# Patient Record
Sex: MALE | Race: BLACK OR AFRICAN AMERICAN | NOT HISPANIC OR LATINO
[De-identification: names, ages, dates, MRNs, and addresses within clinical notes are randomized per-mention and may not be internally consistent; named-entity substitution may affect disease eponyms.]

---

## 2017-06-16 ENCOUNTER — APPOINTMENT (OUTPATIENT)
Dept: NEUROSURGERY | Facility: CLINIC | Age: 67
End: 2017-06-16

## 2017-06-16 ENCOUNTER — INPATIENT (INPATIENT)
Facility: HOSPITAL | Age: 67
LOS: 2 days | Discharge: HOME | DRG: 305 | End: 2017-06-19
Attending: INTERNAL MEDICINE | Admitting: INTERNAL MEDICINE
Payer: MEDICARE

## 2017-06-16 VITALS
HEART RATE: 72 BPM | OXYGEN SATURATION: 94 % | DIASTOLIC BLOOD PRESSURE: 81 MMHG | RESPIRATION RATE: 16 BRPM | SYSTOLIC BLOOD PRESSURE: 180 MMHG

## 2017-06-16 VITALS
BODY MASS INDEX: 37.6 KG/M2 | HEART RATE: 35 BPM | RESPIRATION RATE: 16 BRPM | DIASTOLIC BLOOD PRESSURE: 91 MMHG | OXYGEN SATURATION: 93 % | HEIGHT: 74 IN | SYSTOLIC BLOOD PRESSURE: 190 MMHG | WEIGHT: 293 LBS

## 2017-06-16 VITALS
OXYGEN SATURATION: 95 % | HEART RATE: 42 BPM | DIASTOLIC BLOOD PRESSURE: 90 MMHG | RESPIRATION RATE: 20 BRPM | TEMPERATURE: 98 F | SYSTOLIC BLOOD PRESSURE: 190 MMHG

## 2017-06-16 DIAGNOSIS — I16.0 HYPERTENSIVE URGENCY: ICD-10-CM

## 2017-06-16 DIAGNOSIS — R00.1 BRADYCARDIA, UNSPECIFIED: ICD-10-CM

## 2017-06-16 DIAGNOSIS — Z95.5 PRESENCE OF CORONARY ANGIOPLASTY IMPLANT AND GRAFT: Chronic | ICD-10-CM

## 2017-06-16 DIAGNOSIS — Z98.89 OTHER SPECIFIED POSTPROCEDURAL STATES: Chronic | ICD-10-CM

## 2017-06-16 LAB
ALBUMIN SERPL ELPH-MCNC: 4 G/DL — SIGNIFICANT CHANGE UP (ref 3.3–5)
ALP SERPL-CCNC: 71 U/L — SIGNIFICANT CHANGE UP (ref 40–120)
ALT FLD-CCNC: 23 U/L RC — SIGNIFICANT CHANGE UP (ref 10–45)
ANION GAP SERPL CALC-SCNC: 11 MMOL/L — SIGNIFICANT CHANGE UP (ref 5–17)
AST SERPL-CCNC: 25 U/L — SIGNIFICANT CHANGE UP (ref 10–40)
BASOPHILS # BLD AUTO: 0.1 K/UL — SIGNIFICANT CHANGE UP (ref 0–0.2)
BASOPHILS NFR BLD AUTO: 1.4 % — SIGNIFICANT CHANGE UP (ref 0–2)
BILIRUB SERPL-MCNC: 0.5 MG/DL — SIGNIFICANT CHANGE UP (ref 0.2–1.2)
BUN SERPL-MCNC: 14 MG/DL — SIGNIFICANT CHANGE UP (ref 7–23)
CALCIUM SERPL-MCNC: 9.7 MG/DL — SIGNIFICANT CHANGE UP (ref 8.4–10.5)
CHLORIDE SERPL-SCNC: 99 MMOL/L — SIGNIFICANT CHANGE UP (ref 96–108)
CK MB BLD-MCNC: 2.7 % — SIGNIFICANT CHANGE UP (ref 0–3.5)
CK MB CFR SERPL CALC: 14.4 NG/ML — HIGH (ref 0–6.7)
CK SERPL-CCNC: 530 U/L — HIGH (ref 30–200)
CO2 SERPL-SCNC: 32 MMOL/L — HIGH (ref 22–31)
CREAT SERPL-MCNC: 1.32 MG/DL — HIGH (ref 0.5–1.3)
EOSINOPHIL # BLD AUTO: 0.2 K/UL — SIGNIFICANT CHANGE UP (ref 0–0.5)
EOSINOPHIL NFR BLD AUTO: 2.7 % — SIGNIFICANT CHANGE UP (ref 0–6)
GLUCOSE SERPL-MCNC: 104 MG/DL — HIGH (ref 70–99)
HCT VFR BLD CALC: 44 % — SIGNIFICANT CHANGE UP (ref 39–50)
HGB BLD-MCNC: 13.9 G/DL — SIGNIFICANT CHANGE UP (ref 13–17)
LYMPHOCYTES # BLD AUTO: 1.4 K/UL — SIGNIFICANT CHANGE UP (ref 1–3.3)
LYMPHOCYTES # BLD AUTO: 19.4 % — SIGNIFICANT CHANGE UP (ref 13–44)
MAGNESIUM SERPL-MCNC: 1.8 MG/DL — SIGNIFICANT CHANGE UP (ref 1.6–2.6)
MCHC RBC-ENTMCNC: 31.6 PG — SIGNIFICANT CHANGE UP (ref 27–34)
MCHC RBC-ENTMCNC: 31.7 GM/DL — LOW (ref 32–36)
MCV RBC AUTO: 99.8 FL — SIGNIFICANT CHANGE UP (ref 80–100)
MONOCYTES # BLD AUTO: 0.4 K/UL — SIGNIFICANT CHANGE UP (ref 0–0.9)
MONOCYTES NFR BLD AUTO: 5.7 % — SIGNIFICANT CHANGE UP (ref 2–14)
NEUTROPHILS # BLD AUTO: 5 K/UL — SIGNIFICANT CHANGE UP (ref 1.8–7.4)
NEUTROPHILS NFR BLD AUTO: 70.9 % — SIGNIFICANT CHANGE UP (ref 43–77)
PLATELET # BLD AUTO: 241 K/UL — SIGNIFICANT CHANGE UP (ref 150–400)
POTASSIUM SERPL-MCNC: 4.2 MMOL/L — SIGNIFICANT CHANGE UP (ref 3.5–5.3)
POTASSIUM SERPL-SCNC: 4.2 MMOL/L — SIGNIFICANT CHANGE UP (ref 3.5–5.3)
PROT SERPL-MCNC: 7.8 G/DL — SIGNIFICANT CHANGE UP (ref 6–8.3)
RBC # BLD: 4.41 M/UL — SIGNIFICANT CHANGE UP (ref 4.2–5.8)
RBC # FLD: 12.8 % — SIGNIFICANT CHANGE UP (ref 10.3–14.5)
SODIUM SERPL-SCNC: 142 MMOL/L — SIGNIFICANT CHANGE UP (ref 135–145)
TROPONIN T SERPL-MCNC: 0.11 NG/ML — HIGH (ref 0–0.06)
TROPONIN T SERPL-MCNC: 0.12 NG/ML — HIGH (ref 0–0.06)
WBC # BLD: 7 K/UL — SIGNIFICANT CHANGE UP (ref 3.8–10.5)
WBC # FLD AUTO: 7 K/UL — SIGNIFICANT CHANGE UP (ref 3.8–10.5)

## 2017-06-16 PROCEDURE — 93010 ELECTROCARDIOGRAM REPORT: CPT

## 2017-06-16 PROCEDURE — 99285 EMERGENCY DEPT VISIT HI MDM: CPT | Mod: 25

## 2017-06-16 PROCEDURE — 71010: CPT | Mod: 26

## 2017-06-16 RX ORDER — DEXTROSE 50 % IN WATER 50 %
25 SYRINGE (ML) INTRAVENOUS ONCE
Qty: 0 | Refills: 0 | Status: DISCONTINUED | OUTPATIENT
Start: 2017-06-16 | End: 2017-06-19

## 2017-06-16 RX ORDER — HYDRALAZINE HCL 50 MG
25 TABLET ORAL THREE TIMES A DAY
Qty: 0 | Refills: 0 | Status: DISCONTINUED | OUTPATIENT
Start: 2017-06-16 | End: 2017-06-17

## 2017-06-16 RX ORDER — SODIUM CHLORIDE 9 MG/ML
1000 INJECTION, SOLUTION INTRAVENOUS
Qty: 0 | Refills: 0 | Status: DISCONTINUED | OUTPATIENT
Start: 2017-06-16 | End: 2017-06-19

## 2017-06-16 RX ORDER — ENOXAPARIN SODIUM 100 MG/ML
40 INJECTION SUBCUTANEOUS DAILY
Qty: 0 | Refills: 0 | Status: DISCONTINUED | OUTPATIENT
Start: 2017-06-16 | End: 2017-06-19

## 2017-06-16 RX ORDER — DEXTROSE 50 % IN WATER 50 %
1 SYRINGE (ML) INTRAVENOUS ONCE
Qty: 0 | Refills: 0 | Status: DISCONTINUED | OUTPATIENT
Start: 2017-06-16 | End: 2017-06-19

## 2017-06-16 RX ORDER — SIMVASTATIN 20 MG/1
20 TABLET, FILM COATED ORAL AT BEDTIME
Qty: 0 | Refills: 0 | Status: DISCONTINUED | OUTPATIENT
Start: 2017-06-16 | End: 2017-06-18

## 2017-06-16 RX ORDER — FUROSEMIDE 40 MG
40 TABLET ORAL DAILY
Qty: 0 | Refills: 0 | Status: DISCONTINUED | OUTPATIENT
Start: 2017-06-16 | End: 2017-06-17

## 2017-06-16 RX ORDER — DEXTROSE 50 % IN WATER 50 %
12.5 SYRINGE (ML) INTRAVENOUS ONCE
Qty: 0 | Refills: 0 | Status: DISCONTINUED | OUTPATIENT
Start: 2017-06-16 | End: 2017-06-19

## 2017-06-16 RX ORDER — POTASSIUM CHLORIDE 20 MEQ
20 PACKET (EA) ORAL DAILY
Qty: 0 | Refills: 0 | Status: DISCONTINUED | OUTPATIENT
Start: 2017-06-16 | End: 2017-06-19

## 2017-06-16 RX ORDER — GLUCAGON INJECTION, SOLUTION 0.5 MG/.1ML
1 INJECTION, SOLUTION SUBCUTANEOUS ONCE
Qty: 0 | Refills: 0 | Status: DISCONTINUED | OUTPATIENT
Start: 2017-06-16 | End: 2017-06-19

## 2017-06-16 RX ORDER — HYDRALAZINE HYDROCHLORIDE 25 MG/1
25 TABLET ORAL
Qty: 90 | Refills: 0 | Status: DISCONTINUED | COMMUNITY
Start: 2017-02-16

## 2017-06-16 RX ORDER — ERGOCALCIFEROL 1.25 MG/1
1.25 MG CAPSULE, LIQUID FILLED ORAL
Qty: 4 | Refills: 0 | Status: DISCONTINUED | COMMUNITY
Start: 2017-06-08

## 2017-06-16 RX ORDER — INSULIN LISPRO 100/ML
VIAL (ML) SUBCUTANEOUS
Qty: 0 | Refills: 0 | Status: DISCONTINUED | OUTPATIENT
Start: 2017-06-16 | End: 2017-06-19

## 2017-06-16 RX ORDER — SODIUM SULFATE, POTASSIUM SULFATE, MAGNESIUM SULFATE 17.5; 3.13; 1.6 G/ML; G/ML; G/ML
17.5-3.13-1.6 SOLUTION, CONCENTRATE ORAL
Qty: 354 | Refills: 0 | Status: DISCONTINUED | COMMUNITY
Start: 2017-04-05

## 2017-06-16 RX ORDER — AMLODIPINE BESYLATE 2.5 MG/1
10 TABLET ORAL DAILY
Qty: 0 | Refills: 0 | Status: DISCONTINUED | OUTPATIENT
Start: 2017-06-16 | End: 2017-06-19

## 2017-06-16 RX ORDER — ASPIRIN/CALCIUM CARB/MAGNESIUM 324 MG
81 TABLET ORAL DAILY
Qty: 0 | Refills: 0 | Status: DISCONTINUED | OUTPATIENT
Start: 2017-06-16 | End: 2017-06-19

## 2017-06-16 RX ORDER — LISINOPRIL 2.5 MG/1
10 TABLET ORAL DAILY
Qty: 0 | Refills: 0 | Status: DISCONTINUED | OUTPATIENT
Start: 2017-06-16 | End: 2017-06-17

## 2017-06-16 RX ADMIN — LISINOPRIL 10 MILLIGRAM(S): 2.5 TABLET ORAL at 23:29

## 2017-06-16 RX ADMIN — Medication 40 MILLIGRAM(S): at 21:25

## 2017-06-16 RX ADMIN — ENOXAPARIN SODIUM 40 MILLIGRAM(S): 100 INJECTION SUBCUTANEOUS at 21:25

## 2017-06-16 RX ADMIN — SIMVASTATIN 20 MILLIGRAM(S): 20 TABLET, FILM COATED ORAL at 23:28

## 2017-06-16 RX ADMIN — AMLODIPINE BESYLATE 10 MILLIGRAM(S): 2.5 TABLET ORAL at 21:25

## 2017-06-16 RX ADMIN — Medication 25 MILLIGRAM(S): at 21:27

## 2017-06-16 RX ADMIN — Medication 81 MILLIGRAM(S): at 21:25

## 2017-06-16 NOTE — ED ADULT NURSE NOTE - OBJECTIVE STATEMENT
67yo male walked into ED a+ox3, sent in from PMD office for asymptomatics bradycardia. Pt was at pmd for followup post back surgery, and was found to be bradycardic to 30 (32), pt sent to ED for eval. Pt states at the time he did not feel dizzy, weak, in pain, sob, cp at time of bradycardia. Pt denies dizziness/lightheadedness, sob, cp, n/v/d, pain, abd pain, fevers, chills, cough, change in medications in ED. Pt NSR on CM, in 60s-70s HR. Lung sounds clear. Abd soft round nontender. Pt has hx of 2 stents(2010), htn, hld, dm2.

## 2017-06-16 NOTE — ED PROVIDER NOTE - MEDICAL DECISION MAKING DETAILS
Period of asymptomatic bradycardia now resolved. Will check electrolytes and outpatient Holter monitoring,

## 2017-06-16 NOTE — H&P ADULT - ASSESSMENT
pt  sent  to  er  by spine  md who  noted  hr  of  35, pt  with  no  symptoms, tele,  echo, seen by  house  card,  not  on AV  SATNAM BLOCKING  AGENTS,  CAD WITH  2 STENTS,  LAST  STRESS TEST  2015, REFUSING THIS TEST pt  sent  to  er  by spine  md who  noted  hr  of  35, pt  with  no  symptoms, tele,  echo, seen by  house  card,  not  on AV  SATNAM BLOCKING  AGENTS,  CAD WITH  2 STENTS,  LAST  STRESS TEST  2015, REFUSING THIS TEST, pt  with high  sbp  and  positive troponin,  needs  to  have  ischemia  w/p

## 2017-06-16 NOTE — H&P ADULT - NSHPLABSRESULTS_GEN_ALL_CORE
LABS:                        13.9   7.0   )-----------( 241      ( 16 Jun 2017 13:24 )             44.0     06-16    142  |  99  |  14  ----------------------------<  104<H>  4.2   |  32<H>  |  1.32<H>    Ca    9.7      16 Jun 2017 13:24  Mg     1.8     06-16    TPro  7.8  /  Alb  4.0  /  TBili  0.5  /  DBili  x   /  AST  25  /  ALT  23  /  AlkPhos  71  06-16            RADIOLOGY & ADDITIONAL TESTS:

## 2017-06-16 NOTE — CONSULT NOTE ADULT - PROBLEM SELECTOR RECOMMENDATION 9
Bp improved. On enalapril and norvasc.   Diuresis will also help. Would try to change to PO in next 24 hours.

## 2017-06-16 NOTE — CONSULT NOTE ADULT - ASSESSMENT
66 M with history of CAD and prior PCI noted to have asymptomatic bradycardia, HTN with significantly elevated BP and elevated troponin

## 2017-06-16 NOTE — CONSULT NOTE ADULT - SUBJECTIVE AND OBJECTIVE BOX
House Cardiology Initial Consult  Consult Spectra [phone]    HISTORY OF PRESENT ILLNESS:  66M CAD s/p stent, DM2, LBP. Was at orthopedic clinic    Allergies    No Known Allergies    Intolerances    	    MEDICATIONS:                  PAST MEDICAL & SURGICAL HISTORY:  Osteoarthritis  Lumbar disc disease with radiculopathy  Cervical disc disease with myelopathy  DVT (deep venous thrombosis): left soleal DVT- August 2014- On coumadin  CAD (coronary artery disease)  HTN (Hypertension), Benign  Dyslipidemia  Diabetes: not on meds at this time due to recent 50 lb weight loss  S/P knee surgery: right &amp; left  S/P lumbar laminectomy  Stented coronary artery: 2 Drug elluding stents      FAMILY HISTORY:  No pertinent family history in first degree relatives      SOCIAL HISTORY:    [ ] Non-smoker  [ ] Smoker  [ ] Alcohol    Review of Systems:  Constitutional: [ ] Fever [ ] Chills [ ] Fatigue [ ] Weight change   HEENT: [ ] Blurred vision [ ] Eye Pain [ ] Headache [ ] Runny nose [ ] Sore Throat   Respiratory: [ ] Cough [ ] Wheezing [ ] Shortness of breath  Cardiovascular: [ ] Chest Pain [ ] Palpitations [ ] CAMPOS [ ] PND [ ] Orthopnea  Gastrointestinal: [ ] Abdominal Pain [ ] Diarrhea [ ] Constipation [ ] Hemorrhoids [ ] Nausea [ ] Vomiting  Genitourinary: [ ] Nocturia [ ] Dysuria [ ] Incontinence  Extremities: [ ] Swelling [ ] Joint Pain  Neurologic: [ ] Focal deficit [ ] Paresthesias [ ] Syncope  Lymphatic: [ ] Swelling [ ] Lymphadenopathy   Skin: [ ] Rash [ ] Ecchymoses [ ] Wounds [ ] Lesions  Psychiatry: [ ] Depression [ ] Suicidal/Homicidal Ideation [ ] Anxiety [ ] Sleep Disturbances  [ ] 10 point review of systems is otherwise negative except as mentioned above            [ ]Unable to obtain  PHYSICAL EXAM:  T(C): 36.6, Max: 36.6 (06-16 @ 12:38)  HR: 71 (42 - 71)  BP: 156/100 (156/100 - 190/90)  RR: 20 (20 - 20)  SpO2: 94% (94% - 95%)  Wt(kg): --  I&O's Summary      Appearance: Normal	  HEENT:   Normal oral mucosa, PERRL, EOMI	  Cardiovascular: Normal S1 S2, No JVD, No murmurs, No edema  Respiratory: Lungs clear to auscultation	  Psychiatry: A & O x 3, Mood & affect appropriate  Gastrointestinal:  soft nt nd normoactive bs	  Skin: No rashes, No ecchymoses, No cyanosis	  Neurologic: grossly non-focal  Extremities: Normal range of motion, No clubbing, cyanosis or  Vascular: Peripheral pulses palpable 2+ bilaterally      LABS:	 	    CBC Full  -  ( 16 Jun 2017 13:24 )  WBC Count : 7.0 K/uL  Hemoglobin : 13.9 g/dL  Hematocrit : 44.0 %  Platelet Count - Automated : 241 K/uL  Mean Cell Volume : 99.8 fl  Mean Cell Hemoglobin : 31.6 pg  Mean Cell Hemoglobin Concentration : 31.7 gm/dL  Auto Neutrophil # : 5.0 K/uL  Auto Lymphocyte # : 1.4 K/uL  Auto Monocyte # : 0.4 K/uL  Auto Eosinophil # : 0.2 K/uL  Auto Basophil # : 0.1 K/uL  Auto Neutrophil % : 70.9 %  Auto Lymphocyte % : 19.4 %  Auto Monocyte % : 5.7 %  Auto Eosinophil % : 2.7 %  Auto Basophil % : 1.4 %    06-16    142  |  99  |  14  ----------------------------<  104<H>  4.2   |  32<H>  |  1.32<H>    Ca    9.7      16 Jun 2017 13:24  Mg     1.8     06-16    TPro  7.8  /  Alb  4.0  /  TBili  0.5  /  DBili  x   /  AST  25  /  ALT  23  /  AlkPhos  71  06-16      proBNP: Serum Pro-Brain Natriuretic Peptide: 42 pg/mL (06-16 @ 13:24)    Lipid Profile:   HgA1c:   TSH:       CARDIAC MARKERS:  Troponin T, Serum: 0.12 ng/mL (06-16 @ 13:24)            TELEMETRY: 	    ECG:  	  RADIOLOGY:  OTHER: 	    PREVIOUS DIAGNOSTIC TESTING:    [ ] Echocardiogram:  [ ] Catheterization:  [ ] Stress Test:  	  	  ASSESSMENT/PLAN: 	    66yMale House Cardiology Initial Consult  Consult Spectra [phone]    HISTORY OF PRESENT ILLNESS:  66M CAD s/p stent, DM2, HLD, LBP, DVT 2014. Was at orthopedic clinic when reportedly the patient's HR went to 32 (no strips available) and was subsequently brought to the ED for further evaluation.    Allergies    No Known Allergies    Intolerances    	    MEDICATIONS:                  PAST MEDICAL & SURGICAL HISTORY:  Osteoarthritis  Lumbar disc disease with radiculopathy  Cervical disc disease with myelopathy  DVT (deep venous thrombosis): left soleal DVT- August 2014- On coumadin  CAD (coronary artery disease)  HTN (Hypertension), Benign  Dyslipidemia  Diabetes: not on meds at this time due to recent 50 lb weight loss  S/P knee surgery: right &amp; left  S/P lumbar laminectomy  Stented coronary artery: 2 Drug elluding stents      FAMILY HISTORY:  No pertinent family history in first degree relatives      SOCIAL HISTORY:    [ ] Non-smoker  [ ] Smoker  [ ] Alcohol    Review of Systems:  Constitutional: [ ] Fever [ ] Chills [ ] Fatigue [ ] Weight change   HEENT: [ ] Blurred vision [ ] Eye Pain [ ] Headache [ ] Runny nose [ ] Sore Throat   Respiratory: [ ] Cough [ ] Wheezing [ ] Shortness of breath  Cardiovascular: [ ] Chest Pain [ ] Palpitations [ ] CAMPOS [ ] PND [ ] Orthopnea  Gastrointestinal: [ ] Abdominal Pain [ ] Diarrhea [ ] Constipation [ ] Hemorrhoids [ ] Nausea [ ] Vomiting  Genitourinary: [ ] Nocturia [ ] Dysuria [ ] Incontinence  Extremities: [ ] Swelling [ ] Joint Pain  Neurologic: [ ] Focal deficit [ ] Paresthesias [ ] Syncope  Lymphatic: [ ] Swelling [ ] Lymphadenopathy   Skin: [ ] Rash [ ] Ecchymoses [ ] Wounds [ ] Lesions  Psychiatry: [ ] Depression [ ] Suicidal/Homicidal Ideation [ ] Anxiety [ ] Sleep Disturbances  [ ] 10 point review of systems is otherwise negative except as mentioned above            [ ]Unable to obtain  PHYSICAL EXAM:  T(C): 36.6, Max: 36.6 (06-16 @ 12:38)  HR: 71 (42 - 71)  BP: 156/100 (156/100 - 190/90)  RR: 20 (20 - 20)  SpO2: 94% (94% - 95%)  Wt(kg): --  I&O's Summary      Appearance: Normal	  HEENT:   Normal oral mucosa, PERRL, EOMI	  Cardiovascular: Normal S1 S2, No JVD, No murmurs, No edema  Respiratory: Lungs clear to auscultation	  Psychiatry: A & O x 3, Mood & affect appropriate  Gastrointestinal:  soft nt nd normoactive bs	  Skin: No rashes, No ecchymoses, No cyanosis	  Neurologic: grossly non-focal  Extremities: Normal range of motion, No clubbing, cyanosis or  Vascular: Peripheral pulses palpable 2+ bilaterally      LABS:	 	    CBC Full  -  ( 16 Jun 2017 13:24 )  WBC Count : 7.0 K/uL  Hemoglobin : 13.9 g/dL  Hematocrit : 44.0 %  Platelet Count - Automated : 241 K/uL  Mean Cell Volume : 99.8 fl  Mean Cell Hemoglobin : 31.6 pg  Mean Cell Hemoglobin Concentration : 31.7 gm/dL  Auto Neutrophil # : 5.0 K/uL  Auto Lymphocyte # : 1.4 K/uL  Auto Monocyte # : 0.4 K/uL  Auto Eosinophil # : 0.2 K/uL  Auto Basophil # : 0.1 K/uL  Auto Neutrophil % : 70.9 %  Auto Lymphocyte % : 19.4 %  Auto Monocyte % : 5.7 %  Auto Eosinophil % : 2.7 %  Auto Basophil % : 1.4 %    06-16    142  |  99  |  14  ----------------------------<  104<H>  4.2   |  32<H>  |  1.32<H>    Ca    9.7      16 Jun 2017 13:24  Mg     1.8     06-16    TPro  7.8  /  Alb  4.0  /  TBili  0.5  /  DBili  x   /  AST  25  /  ALT  23  /  AlkPhos  71  06-16      proBNP: Serum Pro-Brain Natriuretic Peptide: 42 pg/mL (06-16 @ 13:24)    Lipid Profile:   HgA1c:   TSH:       CARDIAC MARKERS:  Troponin T, Serum: 0.12 ng/mL (06-16 @ 13:24)  Troponin T, Serum: 0.11 ng/mL (@16:18)    CKMB 14.4      ECG:  	 	    PREVIOUS DIAGNOSTIC TESTING:    [ ] Echocardiogram: 4/12/2015  Conclusions:  1. Endocardium not well visualized; grossly normal left  ventricular function.  2. Unable to accurately evaluate right ventricular size or  systolic function.  3. Minimal mitral regurgitation.  [ ] Catheterization:  [ ] Stress Test:  	  	  ASSESSMENT/PLAN: 	  66M CAD s/p stent, DM2, HLD, LBP, DVT 2014. Was at orthopedic clinic when reportedly the patient's HR went to 32 (no strips available) House Cardiology Initial Consult  Consult Spectra [phone]    HISTORY OF PRESENT ILLNESS:  66M CAD s/p stent, DM2, HLD, LBP, DVT 2014. Was at orthopedic clinic when reportedly the patient's HR went to 32 (no strips available, however reportedly confirmed by auscultation by medical assistant) and was subsequently brought to the ED for further evaluation. BP was high at that time. Pt has no complaints.    Found to have an elevated troponin with a non-ischemic EKG in the ED. HTN to SBP 190s. HR 60s. SpO2 dropped to mid-high 80s and improved with NC.    Allergies    No Known Allergies    MEDICATIONS:      PAST MEDICAL & SURGICAL HISTORY:  Osteoarthritis  Lumbar disc disease with radiculopathy  Cervical disc disease with myelopathy  DVT (deep venous thrombosis): left soleal DVT- August 2014- On coumadin  CAD (coronary artery disease)  HTN (Hypertension), Benign  Dyslipidemia  Diabetes: not on meds at this time due to recent 50 lb weight loss  S/P knee surgery: right &amp; left  S/P lumbar laminectomy  Stented coronary artery: 2 Drug elluding stents    FAMILY HISTORY:  No pertinent family history in first degree relatives    SOCIAL HISTORY:    [ ] Non-smoker  [ ] Smoker  [ ] Alcohol    Review of Systems:  Constitutional: [ -] Fever [- ] Chills [- ] Fatigue [ x] Weight change   HEENT: [ -] Blurred vision [ ] Eye Pain [- ] Headache [- ] Runny nose [ ] Sore Throat   Respiratory: [ -] Cough [- ] Wheezing -[ ] Shortness of breath  Cardiovascular: [- ] Chest Pain [- ] Palpitations [- ] CAMPOS [ ] PND [- ] Orthopnea  Gastrointestinal: [ ] Abdominal Pain [ ] Diarrhea [ ] Constipation [ ] Hemorrhoids [- ] Nausea [- ] Vomiting  Genitourinary: [ ] Nocturia [- ] Dysuria [ ] Incontinence  Extremities: [ ] Swelling [x ] Joint Pain - chronic  Neurologic: [ ] Focal deficit [ ] Paresthesias [- ] Syncope  Lymphatic: [ ] Swelling [ ] Lymphadenopathy   Skin: [- ] Rash [ ] Ecchymoses [ ] Wounds [ ] Lesions  Psychiatry: [ -] Depression [ ] Suicidal/Homicidal Ideation [ ] Anxiety [ ] Sleep Disturbances  [x ] 10 point review of systems is otherwise negative except as mentioned above            [ ]Unable to obtain    PHYSICAL EXAM:  T(C): 36.6, Max: 36.6 (06-16 @ 12:38)  HR: 71 (42 - 71)  BP: 156/100 (156/100 - 190/90)  RR: 20 (20 - 20)  SpO2: 94% (94% - 95%)    Appearance: No acute distress	  HEENT:   MMM	  Cardiovascular: s1s2 rrr no m/g/r, 1+ LE edema  Respiratory: Lungs clear to auscultationbilaterally  Psychiatry: A & O x 3, Mood & affect appropriate  Gastrointestinal:  large, soft nt nd normoactive bs	  Skin: No cyanosis	  Neurologic: grossly non-focal  Extremities: No cyanosis  Vascular: Peripheral pulses palpable 2+ bilaterally      LABS:	 	    CBC Full  -  ( 16 Jun 2017 13:24 )  WBC Count : 7.0 K/uL  Hemoglobin : 13.9 g/dL  Hematocrit : 44.0 %  Platelet Count - Automated : 241 K/uL  Mean Cell Volume : 99.8 fl  Mean Cell Hemoglobin : 31.6 pg  Mean Cell Hemoglobin Concentration : 31.7 gm/dL  Auto Neutrophil # : 5.0 K/uL  Auto Lymphocyte # : 1.4 K/uL  Auto Monocyte # : 0.4 K/uL  Auto Eosinophil # : 0.2 K/uL  Auto Basophil # : 0.1 K/uL  Auto Neutrophil % : 70.9 %  Auto Lymphocyte % : 19.4 %  Auto Monocyte % : 5.7 %  Auto Eosinophil % : 2.7 %  Auto Basophil % : 1.4 %    06-16    142  |  99  |  14  ----------------------------<  104<H>  4.2   |  32<H>  |  1.32<H>    Ca    9.7      16 Jun 2017 13:24  Mg     1.8     06-16    TPro  7.8  /  Alb  4.0  /  TBili  0.5  /  DBili  x   /  AST  25  /  ALT  23  /  AlkPhos  71  06-16    proBNP: Serum Pro-Brain Natriuretic Peptide: 42 pg/mL (06-16 @ 13:24)    CARDIAC MARKERS:  Troponin T, Serum: 0.12 ng/mL (06-16 @ 13:24)  Troponin T, Serum: 0.11 ng/mL (@16:18)    CKMB 14.4      ECG:  	 	SR 73 small r wave III, aVF, PAC. no acute ischemic ST changes.    PREVIOUS DIAGNOSTIC TESTING:    [x] Echocardiogram: 4/12/2015  Conclusions:  1. Endocardium not well visualized; grossly normal left  ventricular function.  2. Unable to accurately evaluate right ventricular size or  systolic function.  3. Minimal mitral regurgitation.  [ ] Catheterization:  [ ] Stress Test:  	  	  ASSESSMENT/PLAN: 	  66M CAD s/p stent, DM2, HLD, LBP, DVT 2014. Was at orthopedic clinic when reportedly the patient's HR went to 32 (no strips available), asymptomatic but found to have elevated BP and mildly elevated troponin. Suspect that these troponins may reflect hypertensive heart disease or demand ischemia in setting of uncontrolled hypertension. Unlikely ACS given lack of symptoms. CPK Mass assay wnl. BNP low but may be false negative due to pt body habitus.  -check TTE  -trend cardiac enzymes  -afterload reduction as needed  -hold AVN blockers. unclear why pt bradycardic. monitor on tele.  -given mild hypoxia ?VERONICA/OHS vs component of early pulmonary edema. afterload reduction as above, with caveat that if pt does not improve would give SL NTG and IV furosemide. House Cardiology Initial Consult  Consult Spectra [phone]    HISTORY OF PRESENT ILLNESS:  66M CAD s/p stent, DM2, HLD, LBP, DVT 2014. Was at orthopedic clinic when reportedly the patient's HR went to 32 (no strips available, however reportedly confirmed by auscultation by medical assistant) and was subsequently brought to the ED for further evaluation. BP was high at that time. Pt has no complaints.    Found to have an elevated troponin with a non-ischemic EKG in the ED. HTN to SBP 190s. HR 60s. SpO2 dropped to mid-high 80s and improved with NC.    Allergies    No Known Allergies    MEDICATIONS:  Reviewed in EMR      PAST MEDICAL & SURGICAL HISTORY:  Osteoarthritis  Lumbar disc disease with radiculopathy  Cervical disc disease with myelopathy  DVT (deep venous thrombosis): left soleal DVT- August 2014- On coumadin  CAD (coronary artery disease)  HTN (Hypertension), Benign  Dyslipidemia  Diabetes: not on meds at this time due to recent 50 lb weight loss  S/P knee surgery: right &amp; left  S/P lumbar laminectomy  Stented coronary artery: 2 Drug elluding stents    FAMILY HISTORY:  No pertinent family history in first degree relatives    SOCIAL HISTORY:    [ X] Non-smoker  [ ] Smoker  [ ] Alcohol    Review of Systems:  Constitutional: [ -] Fever [- ] Chills [- ] Fatigue [ x] Weight change   HEENT: [ -] Blurred vision [ ] Eye Pain [- ] Headache [- ] Runny nose [ ] Sore Throat   Respiratory: [ -] Cough [- ] Wheezing -[ ] Shortness of breath  Cardiovascular: [- ] Chest Pain [- ] Palpitations [- ] CAMPOS [ ] PND [- ] Orthopnea  Gastrointestinal: [ ] Abdominal Pain [ ] Diarrhea [ ] Constipation [ ] Hemorrhoids [- ] Nausea [- ] Vomiting  Genitourinary: [ ] Nocturia [- ] Dysuria [ ] Incontinence  Extremities: [ ] Swelling [x ] Joint Pain - chronic  Neurologic: [ ] Focal deficit [ ] Paresthesias [- ] Syncope  Lymphatic: [ ] Swelling [ ] Lymphadenopathy   Skin: [- ] Rash [ ] Ecchymoses [ ] Wounds [ ] Lesions  Psychiatry: [ -] Depression [ ] Suicidal/Homicidal Ideation [ ] Anxiety [ ] Sleep Disturbances  [x ] 10 point review of systems is otherwise negative except as mentioned above            [ ]Unable to obtain    PHYSICAL EXAM:  T(C): 36.6, Max: 36.6 (06-16 @ 12:38)  HR: 71 (42 - 71)  BP: 156/100 (156/100 - 190/90)  RR: 20 (20 - 20)  SpO2: 94% (94% - 95%)    Appearance: No acute distress	  HEENT:   MMM	  Cardiovascular: s1s2 rrr no m/g/r, 1+ LE edema  Respiratory: Lungs clear to auscultationbilaterally  Psychiatry: A & O x 3, Mood & affect appropriate  Gastrointestinal:  large, soft nt nd normoactive bs	  Skin: No cyanosis	  Neurologic: grossly non-focal  Extremities: No cyanosis  Vascular: Peripheral pulses palpable 2+ bilaterally      LABS:	 	    CBC Full  -  ( 16 Jun 2017 13:24 )  WBC Count : 7.0 K/uL  Hemoglobin : 13.9 g/dL  Hematocrit : 44.0 %  Platelet Count - Automated : 241 K/uL  Mean Cell Volume : 99.8 fl  Mean Cell Hemoglobin : 31.6 pg  Mean Cell Hemoglobin Concentration : 31.7 gm/dL  Auto Neutrophil # : 5.0 K/uL  Auto Lymphocyte # : 1.4 K/uL  Auto Monocyte # : 0.4 K/uL  Auto Eosinophil # : 0.2 K/uL  Auto Basophil # : 0.1 K/uL  Auto Neutrophil % : 70.9 %  Auto Lymphocyte % : 19.4 %  Auto Monocyte % : 5.7 %  Auto Eosinophil % : 2.7 %  Auto Basophil % : 1.4 %    06-16    142  |  99  |  14  ----------------------------<  104<H>  4.2   |  32<H>  |  1.32<H>    Ca    9.7      16 Jun 2017 13:24  Mg     1.8     06-16    TPro  7.8  /  Alb  4.0  /  TBili  0.5  /  DBili  x   /  AST  25  /  ALT  23  /  AlkPhos  71  06-16    proBNP: Serum Pro-Brain Natriuretic Peptide: 42 pg/mL (06-16 @ 13:24)    CARDIAC MARKERS:  Troponin T, Serum: 0.12 ng/mL (06-16 @ 13:24)  Troponin T, Serum: 0.11 ng/mL (@16:18)    CKMB 14.4      ECG:  	 	SR 73 small r wave III, aVF, PAC. no acute ischemic ST changes.    PREVIOUS DIAGNOSTIC TESTING:    [x] Echocardiogram: 4/12/2015  Conclusions:  1. Endocardium not well visualized; grossly normal left  ventricular function.  2. Unable to accurately evaluate right ventricular size or  systolic function.  3. Minimal mitral regurgitation.  [ ] Catheterization:  [ ] Stress Test:  	  	  ASSESSMENT/PLAN: 	  66M CAD s/p stent, DM2, HLD, LBP, DVT 2014. Was at orthopedic clinic when reportedly the patient's HR went to 32 (no strips available), asymptomatic but found to have elevated BP and mildly elevated troponin. Suspect that these troponins may reflect hypertensive heart disease or demand ischemia in setting of uncontrolled hypertension. Unlikely ACS given lack of symptoms. CPK Mass assay wnl. BNP low but may be false negative due to pt body habitus.  -check TTE  -trend cardiac enzymes  -afterload reduction as needed  -hold AVN blockers. unclear why pt bradycardic. monitor on tele.  -given mild hypoxia ?VERONICA/OHS vs component of early pulmonary edema. afterload reduction as above, with caveat that if pt does not improve would give SL NTG and IV furosemide.

## 2017-06-16 NOTE — ED PROVIDER NOTE - CARE PLAN
Principal Discharge DX:	Hypertensive urgency  Secondary Diagnosis:	Bradycardia  Secondary Diagnosis:	NSTEMI (non-ST elevated myocardial infarction)

## 2017-06-16 NOTE — ED PROVIDER NOTE - OBJECTIVE STATEMENT
66 year old male patient with pmhx of CAD s/p 2 stents placed 2010, type 2 DM, lumber disc disease presents to the ED with bradycardia down to 32 at the orthopedist's office. Denies any symptoms at the time. Patient's heartrate is normal in the ED.   Cardiologist: Dr. Flash Hills [phone]

## 2017-06-16 NOTE — H&P ADULT - NSHPREVIEWOFSYSTEMS_GEN_ALL_CORE

## 2017-06-16 NOTE — CONSULT NOTE ADULT - PROBLEM SELECTOR RECOMMENDATION 3
Unclear cause of troponin elevation. Patient denies cardiac symptoms. Is this late presentation of silent MI or related to elevated BP. Stress is reasonable. I explained to the patient that with elevated CE he cannot walk on the treadmill as recent MI cannot be excluded. Pharm stress.

## 2017-06-16 NOTE — H&P ADULT - HISTORY OF PRESENT ILLNESS
66 year old male patient with pmhx of CAD s/p 2 stents placed 2010, type 2 DM, lumber disc herniation,  s/p  laminectoy in  the past,  presents to the ED with bradycardia down to 32 at the orthopedist's office. Denies any symptoms at the time. Patient's heartrate is normal in the ED.,  denies  cp/  sob/  abd pain,  in  er, pt  has  positive  cpk  and  troponin,  seen by  house card,  pt  asymptomatic  Cardiologist: Dr. Flash Hills [phone] 66 year old male patient with pmhx of CAD s/p 2 stents placed 2010, type 2 DM, lumber disc herniation,  s/p  laminectoy in  the past,  presents to the ED with bradycardia down to 32 at the orthopedist's office. Denies any symptoms at the time. Patient's heartrate is normal in the ED.,  denies  cp/  sob/  abd pain,  in  er, pt  has  positive  cpk  and  troponin  NSTEMI, ,  seen by  house card,  pt  asymptomatic  Cardiologist: Dr. Flash Hills [phone]

## 2017-06-16 NOTE — ED ADULT NURSE NOTE - PSH
S/P knee surgery  right & left  S/P lumbar laminectomy    Stented coronary artery  2 Drug elluding stents

## 2017-06-16 NOTE — ED PROVIDER NOTE - PROGRESS NOTE DETAILS
Alejandro Hills Called Dr. Munoz. Dr. Munoz has not seen patient in 3 years. Recommends house cardiology consult. Spoke to house cards fellow who reviewed case. Will see the patient. In the meantime, will get 2nd troponin to trend. Updated patient on need for further evaluation. Sat noted to drop a little bit. Will get x-ray and bnp.

## 2017-06-16 NOTE — ED ADULT NURSE NOTE - PMH
CAD (coronary artery disease)    Cervical disc disease with myelopathy    Diabetes  not on meds at this time due to recent 50 lb weight loss  DVT (deep venous thrombosis)  left soleal DVT- August 2014- On coumadin  Dyslipidemia    HTN (Hypertension), Benign    Lumbar disc disease with radiculopathy    Osteoarthritis

## 2017-06-16 NOTE — H&P ADULT - NSHPPHYSICALEXAM_GEN_ALL_CORE
PHYSICAL EXAMINATION:  Vital Signs Last 24 Hrs  T(C): 36.7, Max: 36.7 (06-16 @ 17:55)  T(F): 98.1, Max: 98.1 (06-16 @ 17:55)  HR: 68 (42 - 71)  BP: 174/97 (156/100 - 190/90)  BP(mean): --  RR: 18 (18 - 20)  SpO2: 100% (94% - 100%)  CAPILLARY BLOOD GLUCOSE        GENERAL: NAD, well-groomed, well-developed  HEAD:  atraumatic, normocephalic  EYES: sclera anicteric  ENMT: mucous membranes moist  NECK: supple, No JVD  CHEST/LUNG: clear to auscultation bilaterally; no rales, rhonchi, or wheezing b/l  HEART: normal S1, S2  ABDOMEN: BS+, soft, ND, NT   EXTREMITIES:  pulses palpable; no clubbing, cyanosis,   2 plus  edema b/l LEs  NEURO: awake, alert, interactive; moves all extremities  SKIN: no rashes or lesions

## 2017-06-17 DIAGNOSIS — I25.10 ATHEROSCLEROTIC HEART DISEASE OF NATIVE CORONARY ARTERY WITHOUT ANGINA PECTORIS: ICD-10-CM

## 2017-06-17 DIAGNOSIS — I16.0 HYPERTENSIVE URGENCY: ICD-10-CM

## 2017-06-17 DIAGNOSIS — R74.8 ABNORMAL LEVELS OF OTHER SERUM ENZYMES: ICD-10-CM

## 2017-06-17 DIAGNOSIS — E78.5 HYPERLIPIDEMIA, UNSPECIFIED: ICD-10-CM

## 2017-06-17 LAB
ANION GAP SERPL CALC-SCNC: 16 MMOL/L — SIGNIFICANT CHANGE UP (ref 5–17)
BUN SERPL-MCNC: 16 MG/DL — SIGNIFICANT CHANGE UP (ref 7–23)
CALCIUM SERPL-MCNC: 9.8 MG/DL — SIGNIFICANT CHANGE UP (ref 8.4–10.5)
CHLORIDE SERPL-SCNC: 94 MMOL/L — LOW (ref 96–108)
CK MB BLD-MCNC: 2.4 % — SIGNIFICANT CHANGE UP (ref 0–3.5)
CK MB CFR SERPL CALC: 11.6 NG/ML — HIGH (ref 0–6.7)
CK SERPL-CCNC: 476 U/L — HIGH (ref 30–200)
CO2 SERPL-SCNC: 29 MMOL/L — SIGNIFICANT CHANGE UP (ref 22–31)
CREAT SERPL-MCNC: 1.29 MG/DL — SIGNIFICANT CHANGE UP (ref 0.5–1.3)
GLUCOSE SERPL-MCNC: 105 MG/DL — HIGH (ref 70–99)
HBA1C BLD-MCNC: 7.4 % — HIGH (ref 4–5.6)
POTASSIUM SERPL-MCNC: 4.2 MMOL/L — SIGNIFICANT CHANGE UP (ref 3.5–5.3)
POTASSIUM SERPL-SCNC: 4.2 MMOL/L — SIGNIFICANT CHANGE UP (ref 3.5–5.3)
SODIUM SERPL-SCNC: 139 MMOL/L — SIGNIFICANT CHANGE UP (ref 135–145)
TROPONIN T SERPL-MCNC: 0.09 NG/ML — HIGH (ref 0–0.06)
TSH SERPL-MCNC: 1.73 UIU/ML — SIGNIFICANT CHANGE UP (ref 0.27–4.2)

## 2017-06-17 PROCEDURE — 99223 1ST HOSP IP/OBS HIGH 75: CPT

## 2017-06-17 RX ORDER — FUROSEMIDE 40 MG
40 TABLET ORAL DAILY
Qty: 0 | Refills: 0 | Status: DISCONTINUED | OUTPATIENT
Start: 2017-06-17 | End: 2017-06-18

## 2017-06-17 RX ORDER — ISOPROPYL ALCOHOL, BENZOCAINE .7; .06 ML/ML; ML/ML
0 SWAB TOPICAL
Qty: 1 | Refills: 0
Start: 2017-06-17

## 2017-06-17 RX ORDER — FUROSEMIDE 40 MG
20 TABLET ORAL ONCE
Qty: 0 | Refills: 0 | Status: COMPLETED | OUTPATIENT
Start: 2017-06-17 | End: 2017-06-17

## 2017-06-17 RX ADMIN — Medication 20 MILLIGRAM(S): at 10:25

## 2017-06-17 RX ADMIN — LISINOPRIL 10 MILLIGRAM(S): 2.5 TABLET ORAL at 05:31

## 2017-06-17 RX ADMIN — Medication 25 MILLIGRAM(S): at 05:31

## 2017-06-17 RX ADMIN — Medication 20 MILLIEQUIVALENT(S): at 13:19

## 2017-06-17 RX ADMIN — AMLODIPINE BESYLATE 10 MILLIGRAM(S): 2.5 TABLET ORAL at 05:31

## 2017-06-17 RX ADMIN — Medication 40 MILLIGRAM(S): at 05:31

## 2017-06-17 RX ADMIN — Medication 81 MILLIGRAM(S): at 13:18

## 2017-06-17 RX ADMIN — SIMVASTATIN 20 MILLIGRAM(S): 20 TABLET, FILM COATED ORAL at 21:56

## 2017-06-17 RX ADMIN — Medication 20 MILLIGRAM(S): at 17:30

## 2017-06-17 RX ADMIN — ENOXAPARIN SODIUM 40 MILLIGRAM(S): 100 INJECTION SUBCUTANEOUS at 13:18

## 2017-06-18 LAB
ANION GAP SERPL CALC-SCNC: 19 MMOL/L — HIGH (ref 5–17)
BUN SERPL-MCNC: 24 MG/DL — HIGH (ref 7–23)
CALCIUM SERPL-MCNC: 9.5 MG/DL — SIGNIFICANT CHANGE UP (ref 8.4–10.5)
CHLORIDE SERPL-SCNC: 93 MMOL/L — LOW (ref 96–108)
CO2 SERPL-SCNC: 29 MMOL/L — SIGNIFICANT CHANGE UP (ref 22–31)
CREAT SERPL-MCNC: 1.42 MG/DL — HIGH (ref 0.5–1.3)
GLUCOSE SERPL-MCNC: 110 MG/DL — HIGH (ref 70–99)
INR BLD: 1.03 RATIO — SIGNIFICANT CHANGE UP (ref 0.88–1.16)
POTASSIUM SERPL-MCNC: 4.1 MMOL/L — SIGNIFICANT CHANGE UP (ref 3.5–5.3)
POTASSIUM SERPL-SCNC: 4.1 MMOL/L — SIGNIFICANT CHANGE UP (ref 3.5–5.3)
PROTHROM AB SERPL-ACNC: 11.6 SEC — SIGNIFICANT CHANGE UP (ref 10–13.1)
SODIUM SERPL-SCNC: 141 MMOL/L — SIGNIFICANT CHANGE UP (ref 135–145)

## 2017-06-18 PROCEDURE — 99232 SBSQ HOSP IP/OBS MODERATE 35: CPT

## 2017-06-18 RX ORDER — ATORVASTATIN CALCIUM 80 MG/1
40 TABLET, FILM COATED ORAL AT BEDTIME
Qty: 0 | Refills: 0 | Status: DISCONTINUED | OUTPATIENT
Start: 2017-06-18 | End: 2017-06-19

## 2017-06-18 RX ORDER — CLOPIDOGREL BISULFATE 75 MG/1
75 TABLET, FILM COATED ORAL DAILY
Qty: 0 | Refills: 0 | Status: DISCONTINUED | OUTPATIENT
Start: 2017-06-18 | End: 2017-06-19

## 2017-06-18 RX ORDER — HYDRALAZINE HCL 50 MG
50 TABLET ORAL THREE TIMES A DAY
Qty: 0 | Refills: 0 | Status: DISCONTINUED | OUTPATIENT
Start: 2017-06-18 | End: 2017-06-19

## 2017-06-18 RX ADMIN — Medication 40 MILLIGRAM(S): at 05:17

## 2017-06-18 RX ADMIN — AMLODIPINE BESYLATE 10 MILLIGRAM(S): 2.5 TABLET ORAL at 05:17

## 2017-06-18 RX ADMIN — Medication 20 MILLIEQUIVALENT(S): at 14:06

## 2017-06-18 RX ADMIN — Medication 81 MILLIGRAM(S): at 14:07

## 2017-06-18 RX ADMIN — ATORVASTATIN CALCIUM 40 MILLIGRAM(S): 80 TABLET, FILM COATED ORAL at 21:44

## 2017-06-18 RX ADMIN — Medication 50 MILLIGRAM(S): at 21:44

## 2017-06-18 RX ADMIN — CLOPIDOGREL BISULFATE 75 MILLIGRAM(S): 75 TABLET, FILM COATED ORAL at 14:06

## 2017-06-18 RX ADMIN — Medication 50 MILLIGRAM(S): at 14:18

## 2017-06-18 RX ADMIN — Medication 20 MILLIGRAM(S): at 05:17

## 2017-06-18 RX ADMIN — ENOXAPARIN SODIUM 40 MILLIGRAM(S): 100 INJECTION SUBCUTANEOUS at 14:18

## 2017-06-18 NOTE — PROVIDER CONTACT NOTE (OTHER) - ASSESSMENT
VSS. . Pt expresses concern that nausea is from new medications received today. Pt does not want to take any anti-nausea meds.

## 2017-06-18 NOTE — PROGRESS NOTE ADULT - SUBJECTIVE AND OBJECTIVE BOX
S/24 Events: No acute events overnight.     O:  T(C): 36.6, Max: 36.7 (06-17 @ 11:41)  HR: 66 (62 - 72)  BP: 153/86 (144/83 - 153/86)  RR: 18 (18 - 18)  SpO2: 94% (93% - 94%)  Wt(kg): --  Daily     Daily     Tele: SR 50-80, PVCs    O/E:  Gen: NAD  HEENT: EOMI  CV: RRR, normal S1 + S2, no m/r/g  Lungs: CTAB  Abd: soft, non-tender  Ext: No edema    Labs:                        13.9   7.0   )-----------( 241      ( 16 Jun 2017 13:24 )             44.0     06-18    141  |  93<L>  |  24<H>  ----------------------------<  110<H>  4.1   |  29  |  1.42<H>    Ca    9.5      18 Jun 2017 08:26  Mg     1.8     06-16    TPro  7.8  /  Alb  4.0  /  TBili  0.5  /  DBili  x   /  AST  25  /  ALT  23  /  AlkPhos  71  06-16      CARDIAC MARKERS ( 17 Jun 2017 00:19 )  x     / 0.09 ng/mL / 476 U/L / x     / 11.6 ng/mL  CARDIAC MARKERS ( 16 Jun 2017 16:18 )  x     / 0.11 ng/mL / 530 U/L / x     / 14.4 ng/mL  CARDIAC MARKERS ( 16 Jun 2017 13:24 )  x     / 0.12 ng/mL / x     / x     / 15.5 ng/mL          Meds:  MEDICATIONS  (STANDING):  simvastatin 20milliGRAM(s) Oral at bedtime  amLODIPine   Tablet 10milliGRAM(s) Oral daily  enoxaparin Injectable 40milliGRAM(s) SubCutaneous daily  aspirin enteric coated 81milliGRAM(s) Oral daily  potassium chloride    Tablet ER 20milliEquivalent(s) Oral daily  insulin lispro (HumaLOG) corrective regimen sliding scale  SubCutaneous three times a day before meals  dextrose 5%. 1000milliLiter(s) IV Continuous <Continuous>  dextrose 50% Injectable 12.5Gram(s) IV Push once  dextrose 50% Injectable 25Gram(s) IV Push once  dextrose 50% Injectable 25Gram(s) IV Push once  furosemide   Injectable 40milliGRAM(s) IV Push daily  enalapril 20milliGRAM(s) Oral two times a day      A/P:  66M CAD s/p PCI OM 2010, DM2, HLD, LBP, DVT 2014. Was at orthopedic clinic when reportedly the patient's HR went to 32 (no strips available), asymptomatic but found to have elevated BP and mildly elevated troponin.       [facility]  Cardiology  [id number] S/24 Events: No acute events overnight. Feels well. No CP/SOB. Does not want stress test or cath. Only willing to do exercise stress but has + enzymes.     O:  T(C): 36.6, Max: 36.7 (06-17 @ 11:41)  HR: 66 (62 - 72)  BP: 153/86 (144/83 - 153/86)  RR: 18 (18 - 18)  SpO2: 94% (93% - 94%)  Wt(kg): --  Daily     Daily     Tele: SR 50-80, PVCs    O/E:  Gen: NAD  HEENT: EOMI  CV: RRR, normal S1 + S2, no m/r/g  Lungs: CTAB  Abd: soft, non-tender  Ext: No edema    Labs:                        13.9   7.0   )-----------( 241      ( 16 Jun 2017 13:24 )             44.0     06-18    141  |  93<L>  |  24<H>  ----------------------------<  110<H>  4.1   |  29  |  1.42<H>    Ca    9.5      18 Jun 2017 08:26  Mg     1.8     06-16    TPro  7.8  /  Alb  4.0  /  TBili  0.5  /  DBili  x   /  AST  25  /  ALT  23  /  AlkPhos  71  06-16      CARDIAC MARKERS ( 17 Jun 2017 00:19 )  x     / 0.09 ng/mL / 476 U/L / x     / 11.6 ng/mL  CARDIAC MARKERS ( 16 Jun 2017 16:18 )  x     / 0.11 ng/mL / 530 U/L / x     / 14.4 ng/mL  CARDIAC MARKERS ( 16 Jun 2017 13:24 )  x     / 0.12 ng/mL / x     / x     / 15.5 ng/mL          Meds:  MEDICATIONS  (STANDING):  simvastatin 20milliGRAM(s) Oral at bedtime  amLODIPine   Tablet 10milliGRAM(s) Oral daily  enoxaparin Injectable 40milliGRAM(s) SubCutaneous daily  aspirin enteric coated 81milliGRAM(s) Oral daily  potassium chloride    Tablet ER 20milliEquivalent(s) Oral daily  insulin lispro (HumaLOG) corrective regimen sliding scale  SubCutaneous three times a day before meals  dextrose 5%. 1000milliLiter(s) IV Continuous <Continuous>  dextrose 50% Injectable 12.5Gram(s) IV Push once  dextrose 50% Injectable 25Gram(s) IV Push once  dextrose 50% Injectable 25Gram(s) IV Push once  furosemide   Injectable 40milliGRAM(s) IV Push daily  enalapril 20milliGRAM(s) Oral two times a day      A/P:  66M CAD s/p PCI OM 2010, DM2, HLD, LBP, DVT 2014. Was at orthopedic clinic when reportedly the patient's HR went to 32 (no strips available), asymptomatic a/w hypertensive emergency (+ enzymes).     - Euvolemic. D/c IV lasix. Cr uptrending likely 2/2 diuresis +/- hypertension. BL Cr 1.0. D/c enalapril as well for now.   - Continue amlodipine 10mg daily.   - No AVN blockers d/t reported edil. No episodes here. Remains SR 50-80 here.   - Start hydralazine 50mg TID  - NSTEMI in setting of hypertensive emergency. Refusing pharm stress or cath. Doesn't want more stents. Would start plavix 75mg daily.   - F/up TTE  - change simvastatin to lipitor 40mg daily.       [facility] Steve  Cardiology Fellow  [id number]

## 2017-06-18 NOTE — PROGRESS NOTE ADULT - ATTENDING COMMENTS
Patient seen and examined. Agree with assessment and plan as outlined above except would continue ACE-I. Hold diuretics as volume status has improved.

## 2017-06-18 NOTE — PROGRESS NOTE ADULT - SUBJECTIVE AND OBJECTIVE BOX
SUBJECTIVE / OVERNIGHT EVENTS: No nausea, vomiting or diarrhea, no fever or chills, no dizziness or chest pain, no dysuria or hematuria .    Vital Signs Last 24 Hrs  T(C): 36.6, Max: 36.7 (06-17 @ 11:41)  HR: 66 (62 - 72)  BP: 153/86 (144/83 - 153/86)  RR: 18 (18 - 18)  SpO2: 94% (93% - 94%)  Wt(kg): --  CAPILLARY BLOOD GLUCOSE  153 (18 Jun 2017 07:22)  145 (17 Jun 2017 21:52)  115 (17 Jun 2017 17:04)  152 (17 Jun 2017 11:52)    I&O's Summary    I & Os for current day (as of 18 Jun 2017 09:49)  =============================================  IN: 1820 ml / OUT: 2850 ml / NET: -1030 ml      PHYSICAL EXAM:  HEAD:  Atraumatic, Normocephalic  EYES: EOMI, PERRLA, conjunctiva and sclera clear  NECK: Supple, No JVD  CHEST/LUNG: Clear to auscultation bilaterally; No wheeze  HEART: Regular rate and rhythm; No murmurs, rubs, or gallops  ABDOMEN: Soft, Nontender, Nondistended; Bowel sounds present  EXTREMITIES:  2+ Peripheral Pulses, No clubbing, cyanosis, or edema  PSYCH: AAOx3  NEUROLOGY: non-focal  SKIN: No rashes or lesions    MEDICATIONS  (STANDING):  simvastatin 20milliGRAM(s) Oral at bedtime  amLODIPine   Tablet 10milliGRAM(s) Oral daily  enoxaparin Injectable 40milliGRAM(s) SubCutaneous daily  aspirin enteric coated 81milliGRAM(s) Oral daily  potassium chloride    Tablet ER 20milliEquivalent(s) Oral daily  insulin lispro (HumaLOG) corrective regimen sliding scale  SubCutaneous three times a day before meals  dextrose 5%. 1000milliLiter(s) IV Continuous <Continuous>  dextrose 50% Injectable 12.5Gram(s) IV Push once  dextrose 50% Injectable 25Gram(s) IV Push once  dextrose 50% Injectable 25Gram(s) IV Push once  hydrALAZINE 50milliGRAM(s) Oral three times a day  clopidogrel Tablet 75milliGRAM(s) Oral daily  atorvastatin 40milliGRAM(s) Oral at bedtime    MEDICATIONS  (PRN):  dextrose Gel 1Dose(s) Oral once PRN Blood Glucose LESS THAN 70 milliGRAM(s)/deciliter  glucagon  Injectable 1milliGRAM(s) IntraMuscular once PRN Glucose LESS THAN 70 milligrams/deciliter      LABS:                        13.9   7.0   )-----------( 241      ( 16 Jun 2017 13:24 )             44.0     06-18    141  |  93<L>  |  24<H>  ----------------------------<  110<H>  4.1   |  29  |  1.42<H>    Ca    9.5      18 Jun 2017 08:26  Mg     1.8     06-16    TPro  7.8  /  Alb  4.0  /  TBili  0.5  /  DBili  x   /  AST  25  /  ALT  23  /  AlkPhos  71  06-16    PT/INR - ( 18 Jun 2017 08:35 )   PT: 11.6 sec;   INR: 1.03 ratio           CARDIAC MARKERS ( 17 Jun 2017 00:19 )  x     / 0.09 ng/mL / 476 U/L / x     / 11.6 ng/mL  CARDIAC MARKERS ( 16 Jun 2017 16:18 )  x     / 0.11 ng/mL / 530 U/L / x     / 14.4 ng/mL  CARDIAC MARKERS ( 16 Jun 2017 13:24 )  x     / 0.12 ng/mL / x     / x     / 15.5 ng/mL              Hemoglobin A1C, Whole Blood: 7.4 % (06-17 @ 08:27)    Thyroid Stimulating Hormone, Serum: 1.73 uIU/mL (06-17 @ 08:27)      Cultures:    EKG:    Radiological Studies:    Consultant(s) Notes Reviewed:      Care Discussed with Consultants/Other Providers:     tele, nsr

## 2017-06-19 VITALS
OXYGEN SATURATION: 95 % | SYSTOLIC BLOOD PRESSURE: 123 MMHG | HEART RATE: 74 BPM | TEMPERATURE: 98 F | DIASTOLIC BLOOD PRESSURE: 78 MMHG | RESPIRATION RATE: 18 BRPM

## 2017-06-19 LAB
ANION GAP SERPL CALC-SCNC: 16 MMOL/L — SIGNIFICANT CHANGE UP (ref 5–17)
BUN SERPL-MCNC: 28 MG/DL — HIGH (ref 7–23)
CALCIUM SERPL-MCNC: 9.5 MG/DL — SIGNIFICANT CHANGE UP (ref 8.4–10.5)
CHLORIDE SERPL-SCNC: 92 MMOL/L — LOW (ref 96–108)
CO2 SERPL-SCNC: 29 MMOL/L — SIGNIFICANT CHANGE UP (ref 22–31)
CREAT SERPL-MCNC: 1.57 MG/DL — HIGH (ref 0.5–1.3)
GLUCOSE SERPL-MCNC: 102 MG/DL — HIGH (ref 70–99)
POTASSIUM SERPL-MCNC: 4.1 MMOL/L — SIGNIFICANT CHANGE UP (ref 3.5–5.3)
POTASSIUM SERPL-SCNC: 4.1 MMOL/L — SIGNIFICANT CHANGE UP (ref 3.5–5.3)
SODIUM SERPL-SCNC: 137 MMOL/L — SIGNIFICANT CHANGE UP (ref 135–145)

## 2017-06-19 PROCEDURE — 85610 PROTHROMBIN TIME: CPT

## 2017-06-19 PROCEDURE — 83036 HEMOGLOBIN GLYCOSYLATED A1C: CPT

## 2017-06-19 PROCEDURE — 80053 COMPREHEN METABOLIC PANEL: CPT

## 2017-06-19 PROCEDURE — 80048 BASIC METABOLIC PNL TOTAL CA: CPT

## 2017-06-19 PROCEDURE — 71045 X-RAY EXAM CHEST 1 VIEW: CPT

## 2017-06-19 PROCEDURE — 99232 SBSQ HOSP IP/OBS MODERATE 35: CPT

## 2017-06-19 PROCEDURE — 99285 EMERGENCY DEPT VISIT HI MDM: CPT | Mod: 25

## 2017-06-19 PROCEDURE — 93306 TTE W/DOPPLER COMPLETE: CPT | Mod: 26

## 2017-06-19 PROCEDURE — 93306 TTE W/DOPPLER COMPLETE: CPT

## 2017-06-19 PROCEDURE — 84484 ASSAY OF TROPONIN QUANT: CPT

## 2017-06-19 PROCEDURE — 84443 ASSAY THYROID STIM HORMONE: CPT

## 2017-06-19 PROCEDURE — 82553 CREATINE MB FRACTION: CPT

## 2017-06-19 PROCEDURE — 83735 ASSAY OF MAGNESIUM: CPT

## 2017-06-19 PROCEDURE — 83880 ASSAY OF NATRIURETIC PEPTIDE: CPT

## 2017-06-19 PROCEDURE — 82550 ASSAY OF CK (CPK): CPT

## 2017-06-19 PROCEDURE — 85027 COMPLETE CBC AUTOMATED: CPT

## 2017-06-19 PROCEDURE — 93005 ELECTROCARDIOGRAM TRACING: CPT

## 2017-06-19 RX ORDER — HYDRALAZINE HCL 50 MG
1 TABLET ORAL
Qty: 0 | Refills: 0 | COMMUNITY

## 2017-06-19 RX ORDER — HYDRALAZINE HCL 50 MG
1 TABLET ORAL
Qty: 90 | Refills: 0
Start: 2017-06-19 | End: 2017-07-19

## 2017-06-19 RX ORDER — ERGOCALCIFEROL 1.25 MG/1
1 CAPSULE ORAL
Qty: 0 | Refills: 0 | COMMUNITY

## 2017-06-19 RX ORDER — ATORVASTATIN CALCIUM 80 MG/1
1 TABLET, FILM COATED ORAL
Qty: 30 | Refills: 0
Start: 2017-06-19 | End: 2017-07-19

## 2017-06-19 RX ORDER — CLOPIDOGREL BISULFATE 75 MG/1
1 TABLET, FILM COATED ORAL
Qty: 30 | Refills: 0
Start: 2017-06-19 | End: 2017-07-19

## 2017-06-19 RX ADMIN — Medication 20 MILLIEQUIVALENT(S): at 11:41

## 2017-06-19 RX ADMIN — Medication 50 MILLIGRAM(S): at 05:12

## 2017-06-19 RX ADMIN — Medication 50 MILLIGRAM(S): at 13:16

## 2017-06-19 RX ADMIN — CLOPIDOGREL BISULFATE 75 MILLIGRAM(S): 75 TABLET, FILM COATED ORAL at 11:41

## 2017-06-19 RX ADMIN — Medication 81 MILLIGRAM(S): at 11:41

## 2017-06-19 RX ADMIN — ENOXAPARIN SODIUM 40 MILLIGRAM(S): 100 INJECTION SUBCUTANEOUS at 11:41

## 2017-06-19 RX ADMIN — AMLODIPINE BESYLATE 10 MILLIGRAM(S): 2.5 TABLET ORAL at 05:12

## 2017-06-19 NOTE — DISCHARGE NOTE ADULT - MEDICATION SUMMARY - MEDICATIONS TO STOP TAKING
I will STOP taking the medications listed below when I get home from the hospital:    simvastatin 20 mg oral tablet  -- 1 tab(s) by mouth once a day    ramipril 2.5 mg oral capsule  -- 1 cap(s) by mouth once a day    Neurontin 400 mg oral capsule  -- 1 cap(s) by mouth once a day (at bedtime)    dexamethasone 4 mg oral tablet  -- 1 tab(s) by mouth once    acetaminophen 325 mg oral tablet  -- 2 tab(s) by mouth every 6 hours, As needed, For Temp over 38.3 C (100.94 F)    acetaminophen 325 mg oral tablet  -- 2 tab(s) by mouth every 6 hours, As needed, Mild Pain    HYDROmorphone 2 mg oral tablet  -- 1 tab(s) by mouth every 4 hours, As needed, Moderate Pain    scopolamine 1.5 mg transdermal film, extended release  -- 1 patch by transdermal patch every 3 days    docusate sodium 100 mg oral capsule  -- 1 cap(s) by mouth 3 times a day    senna oral tablet  -- 2 tab(s) by mouth once a day (at bedtime), As needed, Constipation    amLODIPine 5 mg oral tablet  -- 1 tab(s) by mouth once a day  -- It is very important that you take or use this exactly as directed.  Do not skip doses or discontinue unless directed by your doctor.  Some non-prescription drugs may aggravate your condition.  Read all labels carefully.  If a warning appears, check with your doctor before taking.

## 2017-06-19 NOTE — PROGRESS NOTE ADULT - SUBJECTIVE AND OBJECTIVE BOX
Pt admitted with asymptomatic bradycardia and elevated troponins.  Also has CKD.  Telemetry has shown SR 60-90  Off vasotec and on amlodipine and hydralazine.  No c/p, SOB, dizziness, nausea or vomiting.    	  Vital Signs Last 24 Hrs  T(C): 36.5, Max: 36.7 (06-18 @ 11:23)  T(F): 97.7, Max: 98.1 (06-18 @ 11:23)  HR: 80 (64 - 80)  BP: 151/79 (126/80 - 203/114)  BP(mean): --  RR: 18 (17 - 18)  SpO2: 94% (94% - 96%)  Daily     Daily     I & Os for current day (as of 06-19 @ 07:47)  =============================================  IN: 1680 ml / OUT: 1630 ml / NET: 50 ml      PHYSICAL EXAM:      Constitutional: No fevers    Neck:  No increased JVP    Respiratory:  No SOB    Cardiovascular:  Nl S1S2 no murmur/ gallop    Extremities:  + edema      Neurological  No global or focal deficits            06-18    141  |  93<L>  |  24<H>  ----------------------------<  110<H>  4.1   |  29  |  1.42<H>    Ca    9.5      18 Jun 2017 08:26        PT/INR - ( 18 Jun 2017 08:35 )   PT: 11.6 sec;   INR: 1.03 ratio             MEDICATIONS  (STANDING):  amLODIPine   Tablet 10milliGRAM(s) Oral daily  enoxaparin Injectable 40milliGRAM(s) SubCutaneous daily  aspirin enteric coated 81milliGRAM(s) Oral daily  potassium chloride    Tablet ER 20milliEquivalent(s) Oral daily  insulin lispro (HumaLOG) corrective regimen sliding scale  SubCutaneous three times a day before meals  dextrose 5%. 1000milliLiter(s) IV Continuous <Continuous>  dextrose 50% Injectable 12.5Gram(s) IV Push once  dextrose 50% Injectable 25Gram(s) IV Push once  dextrose 50% Injectable 25Gram(s) IV Push once  hydrALAZINE 50milliGRAM(s) Oral three times a day  clopidogrel Tablet 75milliGRAM(s) Oral daily  atorvastatin 40milliGRAM(s) Oral at bedtime    MEDICATIONS  (PRN):  dextrose Gel 1Dose(s) Oral once PRN Blood Glucose LESS THAN 70 milliGRAM(s)/deciliter  glucagon  Injectable 1milliGRAM(s) IntraMuscular once PRN Glucose LESS THAN 70 milligrams/deciliter      Impression:      h/o DM, CAD, PCI/ stent.  Elevated troponin. ? NSTEMI vs. CKD  Pt has refused stress testing, but will accept echocardiography  BPs better controlled on current medications.  No significant bradycardia    Plan:    Obtain echocardiogram  Continue current medications  including hydralazine, amlodipine.  Follow BPs/ telemetry  May be DC'd home after echo if all remains stable  OP f/u with myself and Dr. Yaw Nieto later thisweek for further cardiac evaluation and treatment.    Miguel Feliz MD  [phone]

## 2017-06-19 NOTE — PROGRESS NOTE ADULT - SUBJECTIVE AND OBJECTIVE BOX
CC: Elevated troponin    Interval History: No significant cardiac events overnight.     MEDICATIONS:  amLODIPine   Tablet 10milliGRAM(s) Oral daily  enoxaparin Injectable 40milliGRAM(s) SubCutaneous daily  aspirin enteric coated 81milliGRAM(s) Oral daily  potassium chloride    Tablet ER 20milliEquivalent(s) Oral daily  insulin lispro (HumaLOG) corrective regimen sliding scale  SubCutaneous three times a day before meals  dextrose 5%. 1000milliLiter(s) IV Continuous <Continuous>  dextrose Gel 1Dose(s) Oral once PRN  dextrose 50% Injectable 12.5Gram(s) IV Push once  dextrose 50% Injectable 25Gram(s) IV Push once  dextrose 50% Injectable 25Gram(s) IV Push once  glucagon  Injectable 1milliGRAM(s) IntraMuscular once PRN  hydrALAZINE 50milliGRAM(s) Oral three times a day  clopidogrel Tablet 75milliGRAM(s) Oral daily  atorvastatin 40milliGRAM(s) Oral at bedtime      LABS:      137  |  92<L>  |  28<H>  ----------------------------<  102<H>  4.1   |  29  |  1.57<H>    Ca    9.5      2017 10:30        PT/INR - ( 2017 08:35 )   PT: 11.6 sec;   INR: 1.03 ratio                   VITAL SIGNS:   T(C): 36.6, Max: 36.6 ( @ 11:57)  HR: 74 (64 - 80)  BP: 123/78 (123/78 - 154/84)  RR: 18 (17 - 18)  SpO2: 95% (94% - 95%)  Wt(kg): --  Daily     Daily Weight in k.5 (2017 07:59)  I&O's Summary  I & Os for 24h ending 2017 07:00  =============================================  IN: 1680 ml / OUT: 1630 ml / NET: 50 ml    I & Os for current day (as of 2017 17:40)  =============================================  IN: 720 ml / OUT: 700 ml / NET: 20 ml      TELE: No significant ectopy

## 2017-06-19 NOTE — DISCHARGE NOTE ADULT - CARE PLAN
Principal Discharge DX:	Hypertensive urgency  Goal:	Resolved  Instructions for follow-up, activity and diet:	Low salt diet  Activity as tolerated.  Take all medication as prescribed.  Follow up with your medical doctor for routine blood pressure monitoring at your next visit.  Notify your doctor if you have any of the following symptoms:   Dizziness, Lightheadedness, Blurry vision, Headache, Chest pain, Shortness of breath  Secondary Diagnosis:	Coronary artery disease involving native coronary artery of native heart without angina pectoris  Instructions for follow-up, activity and diet:	Coronary artery disease is a condition where the arteries the supply the heart muscle get clogges with fatty deposits & puts you at risk for a heart attack  Call your doctor if you have any new pain, pressure, or discomfort in the center of your chest, pain, tingling or discomfort in arms, back, neck, jaw, or stomach, shortness of breath, nausea, vomiting, burping or heartburn, sweating, cold and clammy skin, racing or abnormal heartbeat for more than 10 minutes or if they keep coming & going.  Call 911 and do not tr to get to hospital by care  You can help yourself with lefestyle changes (quitting smoking if you smoke), eat lots of fruits & vegetables & low fat dairy products, not a lot of meat & fatty foods, walk or some form of physical activity most days of the week, lose weight if you are overweight  Take your cardiac medication as prescribed to lower cholesterol, to lower blood pressure, aspirin to prevent blood clots, and diabetes control  Make sure to keep appointments with doctor for cardiac follow up care  Secondary Diagnosis:	Diabetes  Instructions for follow-up, activity and diet:	HgA1C this admission.  Make sure you get your HgA1c checked every three months.  If you take oral diabetes medications, check your blood glucose two times a day.  If you take insulin, check your blood glucose before meals and at bedtime.  It's important not to skip any meals.  Keep a log of your blood glucose results and always take it with you to your doctor appointments.  Keep a list of your current medications including injectables and over the counter medications and bring this medication list with you to all your doctor appointments.  If you have not seen your ophthalmologist this year call for appointment.  Check your feet daily for redness, sores, or openings. Do not self treat. If no improvement in two days call your primary care physician for an appointment.  Low blood sugar (hypoglycemia) is a blood sugar below 70mg/dl. Check your blood sugar if you feel signs/symptoms of hypoglycemia. If your blood sugar is below 70 take 15 grams of carbohydrates (ex 4 oz of apple juice, 3-4 glucose tablets, or 4-6 oz of regular soda) wait 15 minutes and repeat blood sugar to make sure it comes up above 70.  If your blood sugar is above 70 and you are due for a meal, have a meal.  If you are not due for a meal have a snack.  This snack helps keeps your blood sugar at a safe range.  Secondary Diagnosis:	Dyslipidemia  Instructions for follow-up, activity and diet:	Low fat diet  Continue with current rgimen

## 2017-06-19 NOTE — DISCHARGE NOTE ADULT - CARE PROVIDER_API CALL
Franko Hinkle (DO), Diagnostic Radiology; Neuroradiology  [address] [place] [id number]  Phone: [phone]  Fax: [phone]

## 2017-06-19 NOTE — DISCHARGE NOTE ADULT - PATIENT PORTAL LINK FT
“You can access the Follow[employer] Patient Portal, offered by [facility], by registering with the following website: [email or web address]”

## 2017-06-19 NOTE — PROGRESS NOTE ADULT - ASSESSMENT
pt  with  nsr  on tele,  sen by  card,  NSTEMI,  PT REFUSING  ANY  INTERVENTION,   ON  ASA/PLAVIX, PLAN.  DC  HOME IN  AM, IF  STABLE, ON HYDRALAZINE  TODAY, FOLLOW  CREATININE IN AM,  DR GOMEZ  WILL ASSUME  CARE OF PT IN AM
66 M admitted with elevated CE in setting of elevated BP  1. Patient refused cath or stress. CE trending down. Echo with low normal EF.   2. BP better controlled. Continue present medications.  3. Crt up likely dehydration. Hold diuretics. Encourage PO intake. Follow up as out patient. If crt improves will resume ACE-I.   4. No cardiac objection to discharge.     Please page [phone] with questions or call the office [phone].

## 2017-06-19 NOTE — DISCHARGE NOTE ADULT - MEDICATION SUMMARY - MEDICATIONS TO TAKE
I will START or STAY ON the medications listed below when I get home from the hospital:    Glucometer  -- Indication: For Diabetes    Lancets  -- 3 times a day  Disp: 1 Box  -- Indication: For Diabetes    alcohol swabs  -- 3 times a day  Disp: 1 Box  -- Indication: For Diabetes    Glucose test strips  -- 3 times a day  Disp: 1 Box  -- Indication: For Diabetes    Aspirin Enteric Coated 81 mg oral delayed release tablet  -- 1 tab(s) by mouth once a day  -- Indication: For preventive measure    ramipril 10 mg oral capsule  -- 2 cap(s) by mouth once a day  -- Indication: For Hypertensive urgency    metFORMIN 1000 mg oral tablet  -- 1 tab(s) by mouth 1 to 2 times a day  -- Indication: For Diabetes    atorvastatin 40 mg oral tablet  -- 1 tab(s) by mouth once a day (at bedtime) MDD:1  -- Indication: For High cholesterol    clopidogrel 75 mg oral tablet  -- 1 tab(s) by mouth once a day MDD:1  -- Indication: For preventive measure    amLODIPine 10 mg oral tablet  -- 1 tab(s) by mouth once a day  -- Indication: For HTN    hydroCHLOROthiazide 25 mg oral tablet  -- 1 tab(s) by mouth once a day  -- Indication: For HTN    potassium chloride 20 mEq oral tablet, extended release  -- 1 tab(s) by mouth once a day  -- Indication: For supplement    hydrALAZINE 50 mg oral tablet  -- 1 tab(s) by mouth 3 times a day MDD:3  -- Indication: For HTN    Centrum Silver Men's  -- 1 tab(s) by mouth once a day  -- Indication: For supplement

## 2017-06-19 NOTE — DISCHARGE NOTE ADULT - PLAN OF CARE
Resolved Low salt diet  Activity as tolerated.  Take all medication as prescribed.  Follow up with your medical doctor for routine blood pressure monitoring at your next visit.  Notify your doctor if you have any of the following symptoms:   Dizziness, Lightheadedness, Blurry vision, Headache, Chest pain, Shortness of breath Coronary artery disease is a condition where the arteries the supply the heart muscle get clogges with fatty deposits & puts you at risk for a heart attack  Call your doctor if you have any new pain, pressure, or discomfort in the center of your chest, pain, tingling or discomfort in arms, back, neck, jaw, or stomach, shortness of breath, nausea, vomiting, burping or heartburn, sweating, cold and clammy skin, racing or abnormal heartbeat for more than 10 minutes or if they keep coming & going.  Call 911 and do not tr to get to hospital by care  You can help yourself with lefestyle changes (quitting smoking if you smoke), eat lots of fruits & vegetables & low fat dairy products, not a lot of meat & fatty foods, walk or some form of physical activity most days of the week, lose weight if you are overweight  Take your cardiac medication as prescribed to lower cholesterol, to lower blood pressure, aspirin to prevent blood clots, and diabetes control  Make sure to keep appointments with doctor for cardiac follow up care HgA1C this admission.  Make sure you get your HgA1c checked every three months.  If you take oral diabetes medications, check your blood glucose two times a day.  If you take insulin, check your blood glucose before meals and at bedtime.  It's important not to skip any meals.  Keep a log of your blood glucose results and always take it with you to your doctor appointments.  Keep a list of your current medications including injectables and over the counter medications and bring this medication list with you to all your doctor appointments.  If you have not seen your ophthalmologist this year call for appointment.  Check your feet daily for redness, sores, or openings. Do not self treat. If no improvement in two days call your primary care physician for an appointment.  Low blood sugar (hypoglycemia) is a blood sugar below 70mg/dl. Check your blood sugar if you feel signs/symptoms of hypoglycemia. If your blood sugar is below 70 take 15 grams of carbohydrates (ex 4 oz of apple juice, 3-4 glucose tablets, or 4-6 oz of regular soda) wait 15 minutes and repeat blood sugar to make sure it comes up above 70.  If your blood sugar is above 70 and you are due for a meal, have a meal.  If you are not due for a meal have a snack.  This snack helps keeps your blood sugar at a safe range. Low fat diet  Continue with current rgimen

## 2017-06-19 NOTE — DISCHARGE NOTE ADULT - SECONDARY DIAGNOSIS.
Coronary artery disease involving native coronary artery of native heart without angina pectoris Diabetes Dyslipidemia

## 2017-06-19 NOTE — DISCHARGE NOTE ADULT - ADDITIONAL INSTRUCTIONS
Follow up with your primary care physician Follow up with your primary care physician (Dr. Feliz) this week

## 2018-01-22 ENCOUNTER — APPOINTMENT (OUTPATIENT)
Dept: NEUROSURGERY | Facility: CLINIC | Age: 68
End: 2018-01-22
Payer: MEDICARE

## 2018-01-22 VITALS
OXYGEN SATURATION: 92 % | TEMPERATURE: 98 F | HEART RATE: 81 BPM | BODY MASS INDEX: 53.92 KG/M2 | DIASTOLIC BLOOD PRESSURE: 110 MMHG | SYSTOLIC BLOOD PRESSURE: 176 MMHG | WEIGHT: 293 LBS | HEIGHT: 62 IN

## 2018-01-22 VITALS — SYSTOLIC BLOOD PRESSURE: 172 MMHG | DIASTOLIC BLOOD PRESSURE: 90 MMHG

## 2018-01-22 PROCEDURE — 99213 OFFICE O/P EST LOW 20 MIN: CPT

## 2019-01-18 ENCOUNTER — APPOINTMENT (OUTPATIENT)
Dept: NEUROSURGERY | Facility: CLINIC | Age: 69
End: 2019-01-18
Payer: MEDICARE

## 2019-01-18 VITALS
WEIGHT: 280 LBS | TEMPERATURE: 98.3 F | HEIGHT: 73.5 IN | BODY MASS INDEX: 36.32 KG/M2 | HEART RATE: 82 BPM | OXYGEN SATURATION: 94 % | DIASTOLIC BLOOD PRESSURE: 99 MMHG | SYSTOLIC BLOOD PRESSURE: 174 MMHG | RESPIRATION RATE: 17 BRPM

## 2019-01-18 PROCEDURE — 99213 OFFICE O/P EST LOW 20 MIN: CPT

## 2019-01-18 RX ORDER — POTASSIUM CHLORIDE 1500 MG/1
20 TABLET, EXTENDED RELEASE ORAL
Qty: 90 | Refills: 0 | Status: DISCONTINUED | COMMUNITY
Start: 2016-12-08 | End: 2019-01-18

## 2019-01-18 RX ORDER — AMLODIPINE BESYLATE 10 MG/1
10 TABLET ORAL
Qty: 30 | Refills: 0 | Status: DISCONTINUED | COMMUNITY
Start: 2016-11-17 | End: 2019-01-18

## 2019-01-18 NOTE — REVIEW OF SYSTEMS
[Leg Weakness] : leg weakness [Numbness] : numbness [Difficulty Walking] : difficulty walking [Negative] : Heme/Lymph [Hand Weakness] :  hand weakness [Dizziness] : no dizziness [Fainting] : no fainting [Lightheadedness] : no lightheadedness [Vertigo] : no vertigo [Chest Pain] : no chest pain [Palpitations] : no palpitations [Shortness Of Breath] : no shortness of breath

## 2019-01-18 NOTE — ASSESSMENT
[FreeTextEntry1] : Mr. Sullivan returns to the office and offers no complaints with regard to his neck and back. He does have weakness of the right foot which is not new.  Encouraged home exercises with both the arms and the legs. He uses weights and will start to use with the legs.  BP was high in the office today and instructed to monitor, which he does on a daily basis. To call cardio/PCP if consistently greater higher than 130/80.  Has appt. with cardiologist on 2/5/2019.  Verbalized understanding.  Took medications today after initial BP measurement.  He will follow-up in the office in 6 months for further clinical evaluation.
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
## 2019-01-18 NOTE — PHYSICAL EXAM
[FreeTextEntry1] : Awake, alert, and oriented x 3.  Speech is clear and appropriate.  Talkative. Affect is normal.  Voice is strong.  No respiratory distress and normal respiratory rhythm and effort.  Normal skin color and pigmentation.  The sclera and conjunctiva normal.  Ears, nose, and neck normal in appearance.  Left hand dominant.  Range of motion cervical spine decreased with extension and rotation and without pain in all directions.  No pain to palpation in the cervical and paracervical regions.  No spasm palpated. Positive Middleton’s sign on the left. Motor strength in the upper extremities 5/5 in all muscle groups tested in a seated position except hand intrinsics, right weaker than left and rated at 4+/5.  Incisions well healed.  .\par \par Rises from a seated position in a slow and comfortable fashion.  Gait is alternating, asymmetric and stable with the use of a cane.  ROM lumbar spine decreased and nonpainful in all directions.  No pain to palpation in the lumbar spine or paraspinal regions. Incision well healed.  Motor strength in the lower extremities is 5/5 in the iliopsoas, quadriceps, and hamstrings with 3/5 in right DF.  2+ ankle edema.\par \par

## 2019-04-08 ENCOUNTER — RESULT REVIEW (OUTPATIENT)
Age: 69
End: 2019-04-08

## 2019-04-08 ENCOUNTER — OUTPATIENT (OUTPATIENT)
Dept: OUTPATIENT SERVICES | Facility: HOSPITAL | Age: 69
LOS: 1 days | End: 2019-04-08
Payer: MEDICARE

## 2019-04-08 DIAGNOSIS — Z95.5 PRESENCE OF CORONARY ANGIOPLASTY IMPLANT AND GRAFT: Chronic | ICD-10-CM

## 2019-04-08 DIAGNOSIS — K86.2 CYST OF PANCREAS: ICD-10-CM

## 2019-04-08 DIAGNOSIS — Z98.89 OTHER SPECIFIED POSTPROCEDURAL STATES: Chronic | ICD-10-CM

## 2019-04-08 PROCEDURE — 88313 SPECIAL STAINS GROUP 2: CPT

## 2019-04-08 PROCEDURE — 88313 SPECIAL STAINS GROUP 2: CPT | Mod: 26

## 2019-04-08 PROCEDURE — 43242 EGD US FINE NEEDLE BX/ASPIR: CPT

## 2019-04-08 PROCEDURE — 88305 TISSUE EXAM BY PATHOLOGIST: CPT

## 2019-04-08 PROCEDURE — 88173 CYTOPATH EVAL FNA REPORT: CPT | Mod: 26

## 2019-04-08 PROCEDURE — 88305 TISSUE EXAM BY PATHOLOGIST: CPT | Mod: 26

## 2019-04-08 PROCEDURE — 88312 SPECIAL STAINS GROUP 1: CPT

## 2019-04-08 PROCEDURE — 88173 CYTOPATH EVAL FNA REPORT: CPT

## 2019-04-08 PROCEDURE — 88312 SPECIAL STAINS GROUP 1: CPT | Mod: 26

## 2019-04-09 LAB — NON-GYNECOLOGICAL CYTOLOGY STUDY: SIGNIFICANT CHANGE UP

## 2019-04-10 LAB — SURGICAL PATHOLOGY STUDY: SIGNIFICANT CHANGE UP

## 2019-07-10 NOTE — PATIENT PROFILE ADULT. - HEALTHCARE INFORMATION NEEDED, PROFILE
Detail Level: Simple Action 3: Continue Other Instructions: PA on file til 10/2019 Continue Regimen: Cosentyx 150mg (2) injections monthly none

## 2019-07-12 ENCOUNTER — APPOINTMENT (OUTPATIENT)
Dept: NEUROSURGERY | Facility: CLINIC | Age: 69
End: 2019-07-12
Payer: MEDICARE

## 2019-07-12 VITALS
DIASTOLIC BLOOD PRESSURE: 109 MMHG | WEIGHT: 280 LBS | RESPIRATION RATE: 17 BRPM | OXYGEN SATURATION: 89 % | HEIGHT: 73.5 IN | BODY MASS INDEX: 36.32 KG/M2 | SYSTOLIC BLOOD PRESSURE: 176 MMHG | HEART RATE: 80 BPM | TEMPERATURE: 98.4 F

## 2019-07-12 PROCEDURE — 99213 OFFICE O/P EST LOW 20 MIN: CPT

## 2019-07-12 NOTE — PHYSICAL EXAM
[General Appearance - Alert] : alert [General Appearance - In No Acute Distress] : in no acute distress [General Appearance - Well Nourished] : well nourished [General Appearance - Well Developed] : well developed [General Appearance - Well-Appearing] : healthy appearing [Oriented To Time, Place, And Person] : oriented to person, place, and time [Impaired Insight] : insight and judgment were intact [Mood] : the mood was normal [Memory Recent] : recent memory was not impaired [Affect] : the affect was normal [Person] : oriented to person [Place] : oriented to place [Time] : oriented to time [Cranial Nerves Optic (II)] : visual acuity intact bilaterally,  pupils equal round and reactive to light [Cranial Nerves Trigeminal (V)] : facial sensation intact symmetrically [Cranial Nerves Oculomotor (III)] : extraocular motion intact [Cranial Nerves Vestibulocochlear (VIII)] : hearing was intact bilaterally [Cranial Nerves Facial (VII)] : face symmetrical [Cranial Nerves Glossopharyngeal (IX)] : tongue and palate midline [Cranial Nerves Accessory (XI - Cranial And Spinal)] : head turning and shoulder shrug symmetric [Cranial Nerves Hypoglossal (XII)] : there was no tongue deviation with protrusion [Motor Strength] : muscle strength was normal in all four extremities [Motor Handedness Left-Handed] : the patient is left hand dominant [Balance] : balance was intact [Outer Ear] : the ears and nose were normal in appearance [Hearing Threshold Finger Rub Not [place]] : hearing was normal [Oropharynx] : the oropharynx was normal [Neck Appearance] : the appearance of the neck was normal [Respiration, Rhythm And Depth] : normal respiratory rhythm and effort [Exaggerated Use Of Accessory Muscles For Inspiration] : no accessory muscle use [Heart Rate And Rhythm] : heart rate was normal and rhythm regular [Abnormal Walk] : normal gait [Involuntary Movements] : no involuntary movements were seen [Motor Tone] : muscle strength and tone were normal [Skin Color & Pigmentation] : normal skin color and pigmentation [] : no rash [Limited Balance] : balance was intact

## 2019-07-12 NOTE — HISTORY OF PRESENT ILLNESS
[FreeTextEntry1] : MIKKI CHO is a very pleasant 68 year left-handed male who is here for a follow up visit. He is s/p C3-4, C4-5 ACDFI from 12/22/2014 and L1-2 lami with excision of large extruded disc on 8/12/2014. He comes to the office today reporting feeling "great". As far as his neck and back go, he feels well. He denies numbness, tingling, weakness in extremities.  He denies problems with manual dexterity and denies bowel and bladder problems.\par \par He follows up with his cardiologist Dr. Joel Goldberg on a regular basis.  He takes Plavix for 2 cardiac stents that were implanted in 2010.\par \par He is a retired  and retired in 2012. \par

## 2019-07-12 NOTE — ASSESSMENT
[FreeTextEntry1] : IMPRESSION:\par 1. Pt is neurologically intact.\par 2. No complains of pain, numbness, tingling, weakness in extremities.\par Elevated blood pressure that has been discussed with him. He is not symptomatic.\par \par \par PLAN:\par 1. Follow up with us prn.\par

## 2019-11-14 ENCOUNTER — APPOINTMENT (OUTPATIENT)
Dept: ULTRASOUND IMAGING | Facility: IMAGING CENTER | Age: 69
End: 2019-11-14
Payer: MEDICARE

## 2019-11-14 ENCOUNTER — OUTPATIENT (OUTPATIENT)
Dept: OUTPATIENT SERVICES | Facility: HOSPITAL | Age: 69
LOS: 1 days | End: 2019-11-14
Payer: MEDICARE

## 2019-11-14 DIAGNOSIS — E78.5 HYPERLIPIDEMIA, UNSPECIFIED: ICD-10-CM

## 2019-11-14 DIAGNOSIS — I10 ESSENTIAL (PRIMARY) HYPERTENSION: ICD-10-CM

## 2019-11-14 DIAGNOSIS — Z98.89 OTHER SPECIFIED POSTPROCEDURAL STATES: Chronic | ICD-10-CM

## 2019-11-14 DIAGNOSIS — Z95.5 PRESENCE OF CORONARY ANGIOPLASTY IMPLANT AND GRAFT: Chronic | ICD-10-CM

## 2019-11-14 DIAGNOSIS — N18.3 CHRONIC KIDNEY DISEASE, STAGE 3 (MODERATE): ICD-10-CM

## 2019-11-14 DIAGNOSIS — E11.9 TYPE 2 DIABETES MELLITUS WITHOUT COMPLICATIONS: ICD-10-CM

## 2019-11-14 DIAGNOSIS — N40.0 BENIGN PROSTATIC HYPERPLASIA WITHOUT LOWER URINARY TRACT SYMPTOMS: ICD-10-CM

## 2019-11-14 PROCEDURE — 76770 US EXAM ABDO BACK WALL COMP: CPT

## 2019-11-14 PROCEDURE — 76770 US EXAM ABDO BACK WALL COMP: CPT | Mod: 26

## 2020-02-05 ENCOUNTER — APPOINTMENT (OUTPATIENT)
Dept: UROLOGY | Facility: CLINIC | Age: 70
End: 2020-02-05
Payer: MEDICARE

## 2020-02-05 VITALS
HEART RATE: 69 BPM | TEMPERATURE: 98.6 F | BODY MASS INDEX: 38.6 KG/M2 | SYSTOLIC BLOOD PRESSURE: 170 MMHG | HEIGHT: 72 IN | RESPIRATION RATE: 16 BRPM | WEIGHT: 285 LBS | DIASTOLIC BLOOD PRESSURE: 89 MMHG

## 2020-02-05 PROCEDURE — 99213 OFFICE O/P EST LOW 20 MIN: CPT

## 2020-02-05 NOTE — PHYSICAL EXAM
[General Appearance - Well Developed] : well developed [General Appearance - Well Nourished] : well nourished [Normal Appearance] : normal appearance [Well Groomed] : well groomed [General Appearance - In No Acute Distress] : no acute distress [Edema] : no peripheral edema [Exaggerated Use Of Accessory Muscles For Inspiration] : no accessory muscle use [Respiration, Rhythm And Depth] : normal respiratory rhythm and effort [Abdomen Soft] : soft [Urethral Meatus] : meatus normal [Costovertebral Angle Tenderness] : no ~M costovertebral angle tenderness [Abdomen Tenderness] : non-tender [Urinary Bladder Findings] : the bladder was normal on palpation [Scrotum] : the scrotum was normal [Testes Mass (___cm)] : there were no testicular masses [No Prostate Nodules] : no prostate nodules [Normal Station and Gait] : the gait and station were normal for the patient's age [] : no rash [Oriented To Time, Place, And Person] : oriented to person, place, and time [No Focal Deficits] : no focal deficits [Affect] : the affect was normal [Mood] : the mood was normal [Not Anxious] : not anxious [No Palpable Adenopathy] : no palpable adenopathy [Prostate Size ___ gm] : prostate size [unfilled] gm [FreeTextEntry1] : .

## 2020-02-05 NOTE — HISTORY OF PRESENT ILLNESS
[FreeTextEntry1] : 69 year old male presents for elevated PSA and BPH\par Pt has HTN and diabetes. \par PSA was 7.48. Creatinine was 1.5\par \par Nocturia x2-4 with a poor stream. There is intermittency and a feeling of incomplete emptying. \par Repeat PSA today. Repeat Creatinine today. \par Prescribed Tamsulosin \par Schedule pt for prostate MRI.

## 2020-02-05 NOTE — REVIEW OF SYSTEMS
[Negative] : Heme/Lymph [Chest Pain] : chest pain [No erections] : no erections [Seen by urologist before (Name)  ___] : Preciously seen by a urologist: [unfilled] [Wake up at night to urinate  How many times?  ___] : wakes up to urinate [unfilled] times during the night [Slow urine stream] : slow urine stream [Joint Pain] : joint pain [Joint Swelling] : joint swelling [Limb Swelling] : limb swelling [Difficulty Walking] : difficulty walking

## 2020-02-05 NOTE — ASSESSMENT
[FreeTextEntry1] : Nocturia x2-4 with a poor stream. There is intermittency and a feeling of incomplete emptying. \par Repeat PSA today. Repeat Creatinine today. \par Prescribed Tamsulosin \par Schedule pt for prostate MRI.

## 2020-02-05 NOTE — ADDENDUM
[FreeTextEntry1] :  I, Anna Diaz, acted solely as a scribe for Dr. Db Baxter. The documentation recorded by the scribe accurately reflects the service I personally performed and the decision by me.\par

## 2020-02-06 LAB
CREAT SERPL-MCNC: 1.6 MG/DL
PSA SERPL-MCNC: 8.97 NG/ML

## 2020-02-27 ENCOUNTER — FORM ENCOUNTER (OUTPATIENT)
Age: 70
End: 2020-02-27

## 2020-02-28 ENCOUNTER — OUTPATIENT (OUTPATIENT)
Dept: OUTPATIENT SERVICES | Facility: HOSPITAL | Age: 70
LOS: 1 days | End: 2020-02-28
Payer: MEDICARE

## 2020-02-28 ENCOUNTER — APPOINTMENT (OUTPATIENT)
Dept: MRI IMAGING | Facility: IMAGING CENTER | Age: 70
End: 2020-02-28
Payer: MEDICARE

## 2020-02-28 DIAGNOSIS — Z95.5 PRESENCE OF CORONARY ANGIOPLASTY IMPLANT AND GRAFT: Chronic | ICD-10-CM

## 2020-02-28 DIAGNOSIS — Z98.89 OTHER SPECIFIED POSTPROCEDURAL STATES: Chronic | ICD-10-CM

## 2020-02-28 DIAGNOSIS — R97.20 ELEVATED PROSTATE SPECIFIC ANTIGEN [PSA]: ICD-10-CM

## 2020-02-28 PROCEDURE — 76377 3D RENDER W/INTRP POSTPROCES: CPT

## 2020-02-28 PROCEDURE — 72197 MRI PELVIS W/O & W/DYE: CPT

## 2020-02-28 PROCEDURE — 72197 MRI PELVIS W/O & W/DYE: CPT | Mod: 26

## 2020-02-28 PROCEDURE — 76377 3D RENDER W/INTRP POSTPROCES: CPT | Mod: 26

## 2020-03-04 ENCOUNTER — APPOINTMENT (OUTPATIENT)
Dept: UROLOGY | Facility: CLINIC | Age: 70
End: 2020-03-04
Payer: MEDICARE

## 2020-03-04 DIAGNOSIS — R97.20 ELEVATED PROSTATE, SPECIFIC ANTIGEN [PSA]: ICD-10-CM

## 2020-03-04 PROCEDURE — 99213 OFFICE O/P EST LOW 20 MIN: CPT

## 2020-03-04 NOTE — REVIEW OF SYSTEMS
[No erections] : no erections [Chest Pain] : chest pain [Seen by urologist before (Name)  ___] : Preciously seen by a urologist: [unfilled] [Wake up at night to urinate  How many times?  ___] : wakes up to urinate [unfilled] times during the night [Joint Pain] : joint pain [Slow urine stream] : slow urine stream [Joint Swelling] : joint swelling [Limb Swelling] : limb swelling [Difficulty Walking] : difficulty walking [Negative] : Endocrine

## 2020-03-04 NOTE — ASSESSMENT
[FreeTextEntry1] : Creatinine was 1.60 on 02/05/2020. PSA was 8.97. \par MRI prostate on 02/28/2020 showed prostate volume of 84cc and no suspicious lesions with a PIRADS score of 1.\par Advised pt to resume Tamsulosin. Renewed. \par RTO in 1 month for a uroflow.

## 2020-03-04 NOTE — HISTORY OF PRESENT ILLNESS
[FreeTextEntry1] : 69 year old male presents for elevated PSA and BPH\par Pt has HTN and diabetes. Last week he had a suddenly drop in his blood pressure due to medication he had been given. \par Nocturia x2-4 with a poor stream. There is intermittency and a feeling of incomplete emptying. \par Pt had been taking Tamsulosin, but recently stopped due to his blood pressure drop. \par Pt is 278lb today. \par \par Creatinine was 1.60 on 02/05/2020. PSA was 8.97. \par MRI prostate on 02/28/2020 showed prostate volume of 84cc and no suspicious lesions with a PIRADS score of 1.\par Advised pt to resume Tamsulosin. Renewed. \par RTO in 1 month for a uroflow.

## 2020-03-04 NOTE — PHYSICAL EXAM
[General Appearance - Well Developed] : well developed [Normal Appearance] : normal appearance [General Appearance - Well Nourished] : well nourished [Well Groomed] : well groomed [General Appearance - In No Acute Distress] : no acute distress [Abdomen Soft] : soft [Abdomen Tenderness] : non-tender [Costovertebral Angle Tenderness] : no ~M costovertebral angle tenderness [Urethral Meatus] : meatus normal [Urinary Bladder Findings] : the bladder was normal on palpation [Scrotum] : the scrotum was normal [Testes Mass (___cm)] : there were no testicular masses [No Prostate Nodules] : no prostate nodules [Prostate Size ___ gm] : prostate size [unfilled] gm [Edema] : no peripheral edema [] : no respiratory distress [Respiration, Rhythm And Depth] : normal respiratory rhythm and effort [Exaggerated Use Of Accessory Muscles For Inspiration] : no accessory muscle use [Oriented To Time, Place, And Person] : oriented to person, place, and time [Affect] : the affect was normal [Mood] : the mood was normal [Not Anxious] : not anxious [Normal Station and Gait] : the gait and station were normal for the patient's age [No Focal Deficits] : no focal deficits [No Palpable Adenopathy] : no palpable adenopathy

## 2020-06-10 ENCOUNTER — APPOINTMENT (OUTPATIENT)
Dept: UROLOGY | Facility: CLINIC | Age: 70
End: 2020-06-10

## 2020-07-20 ENCOUNTER — APPOINTMENT (OUTPATIENT)
Dept: UROLOGY | Facility: CLINIC | Age: 70
End: 2020-07-20
Payer: MEDICARE

## 2020-07-20 PROCEDURE — 99442: CPT | Mod: 95

## 2021-01-04 ENCOUNTER — APPOINTMENT (OUTPATIENT)
Dept: UROLOGY | Facility: CLINIC | Age: 71
End: 2021-01-04
Payer: MEDICARE

## 2021-01-04 ENCOUNTER — APPOINTMENT (OUTPATIENT)
Dept: UROLOGY | Facility: CLINIC | Age: 71
End: 2021-01-04

## 2021-01-04 PROCEDURE — 99442: CPT | Mod: 95

## 2021-02-23 ENCOUNTER — APPOINTMENT (OUTPATIENT)
Dept: UROLOGY | Facility: CLINIC | Age: 71
End: 2021-02-23
Payer: MEDICARE

## 2021-02-23 PROCEDURE — 51741 ELECTRO-UROFLOWMETRY FIRST: CPT

## 2021-02-23 PROCEDURE — 99213 OFFICE O/P EST LOW 20 MIN: CPT | Mod: 25

## 2021-02-23 PROCEDURE — 51798 US URINE CAPACITY MEASURE: CPT | Mod: 59

## 2021-02-23 NOTE — HISTORY OF PRESENT ILLNESS
[Urinary Retention] : urinary retention [Urinary Urgency] : urinary urgency [Urinary Frequency] : urinary frequency [Nocturia] : nocturia [Weak Stream] : weak stream [Intermittency] : intermittency [Post-Void Dribbling] : post-void dribbling [Dysuria] : dysuria [___ x Hour] : occur [unfilled] times an hour [Unchanged] : unchanged [FreeTextEntry1] : BPH f/u \par Last PSA 1 year ago was 8.97\par MRI prostate No suspicious lesions \par Prostate vol 84 ml \par Uroflow \par Vol 64 ml Peak flow of 11ml/sec \par PVR O\par Pt.is coping at present and I suggest we treast conservastively.\par  [Urinary Incontinence] : no urinary incontinence [Erectile Dysfunction] : no Erectile Dysfunction [Hematuria - Gross] : no gross hematuria [Hematuria - Microscopic] : no microscopic hematuria [Bladder Spasm] : no bladder spasm [Abdominal Pain] : no abdominal pain [Flank Pain] : no flank pain [Edema] : ~T edema was not present [Weight Loss] : no recent ~M [unfilled] weight loss [Fever] : no fever [Fatigue] : no fatigue [Nausea] : no nausea [Anorexia] : no anorexia [Incisional Drainage] : no incisional drainage [Incisional Pain] : no incisional pain [Ostomy Problems] : no ostomy problems [de-identified] : 6/day and 2/night [de-identified] : 6

## 2021-02-23 NOTE — PHYSICAL EXAM
[Urethral Meatus] : meatus normal [Penis Abnormality] : normal circumcised penis [Urinary Bladder Findings] : the bladder was normal on palpation [Scrotum] : the scrotum was normal [Rectal Exam - Seminal Vesicles] : the seminal vesicles were normal [Epididymis] : the epididymides were normal [Testes Tenderness] : no tenderness of the testes [Testes Mass (___cm)] : there were no testicular masses [Anus Abnormality] : the anus and perineum were normal [Rectal Exam - Rectum] : digital rectal exam was normal [Prostate Tenderness] : the prostate was not tender [No Prostate Nodules] : no prostate nodules [Prostate Size ___ gm] : prostate size [unfilled] gm

## 2021-02-24 LAB
CREAT SERPL-MCNC: 1.97 MG/DL
PSA SERPL-MCNC: 8.17 NG/ML

## 2021-08-17 ENCOUNTER — APPOINTMENT (OUTPATIENT)
Dept: UROLOGY | Facility: CLINIC | Age: 71
End: 2021-08-17
Payer: MEDICARE

## 2021-08-17 VITALS
RESPIRATION RATE: 18 BRPM | WEIGHT: 285 LBS | BODY MASS INDEX: 38.6 KG/M2 | DIASTOLIC BLOOD PRESSURE: 93 MMHG | HEIGHT: 72 IN | HEART RATE: 91 BPM | SYSTOLIC BLOOD PRESSURE: 196 MMHG | TEMPERATURE: 98 F

## 2021-08-17 PROCEDURE — 99212 OFFICE O/P EST SF 10 MIN: CPT

## 2021-08-17 NOTE — HISTORY OF PRESENT ILLNESS
[FreeTextEntry1] : The patient is a 70 year old male presenting today for a follow up visit. \par He reports that his urination is improved. \par He has nocturia 2 times per night. \par The patient has obstructive voiding symptoms.\par He currently takes Tamsulosin 0.4 mg, two capsules at night. \par His recent creatinine was 1.88. \par His PSA from 2/23/21 was 8.17. \par \par The digital rectal exam was normal with a smooth, regular 50+ g prostate. There were no nodules. \par \par Blood work today includes prostate Ca screen.\par \par I am scheduling him for a TURP with Dr. Flores due to his significant obstructive voiding symptoms. He would prefer to have the surgery before his symptoms become more severe. I advised him of all of the after effects of the surgery.\par \par The patient will return to the office in 6 months.

## 2021-08-17 NOTE — ASSESSMENT
[FreeTextEntry1] : The patient is a 70 year old male presenting today for a follow up visit. \par He reports that his urination is improved. \par He has nocturia 2 times per night. \par The patient has obstructive voiding symptoms.\par He currently takes Tamsulosin 0.4 mg, two capsules at night. \par His recent creatinine was 1.88. \par His PSA from 2/23/21 was 8.17. \par \par The digital rectal exam was normal with a smooth, regular 50+ g prostate. There were no nodules. \par \par Blood work today includes prostate Ca screen.\par \par I am scheduling him for a TURP with Dr. Flores due to his significant obstructive voiding symptoms. He would prefer to have the surgery before his symptoms become more severe. I advised him of all of the after effects of the surgery.\par \par The patient will return to the office in 6 months. \par \par I spent 25 minutes with the patient.

## 2021-10-25 ENCOUNTER — EMERGENCY (EMERGENCY)
Facility: HOSPITAL | Age: 71
LOS: 1 days | Discharge: HOME | End: 2021-10-25
Attending: STUDENT IN AN ORGANIZED HEALTH CARE EDUCATION/TRAINING PROGRAM
Payer: MEDICARE

## 2021-10-25 VITALS
OXYGEN SATURATION: 94 % | WEIGHT: 240.08 LBS | HEIGHT: 74 IN | DIASTOLIC BLOOD PRESSURE: 96 MMHG | SYSTOLIC BLOOD PRESSURE: 190 MMHG | RESPIRATION RATE: 18 BRPM | TEMPERATURE: 98 F | HEART RATE: 67 BPM

## 2021-10-25 DIAGNOSIS — Z98.89 OTHER SPECIFIED POSTPROCEDURAL STATES: Chronic | ICD-10-CM

## 2021-10-25 DIAGNOSIS — Z95.5 PRESENCE OF CORONARY ANGIOPLASTY IMPLANT AND GRAFT: Chronic | ICD-10-CM

## 2021-10-25 PROCEDURE — 93010 ELECTROCARDIOGRAM REPORT: CPT

## 2021-10-25 PROCEDURE — 99285 EMERGENCY DEPT VISIT HI MDM: CPT

## 2021-10-26 VITALS
TEMPERATURE: 98 F | RESPIRATION RATE: 18 BRPM | HEART RATE: 67 BPM | SYSTOLIC BLOOD PRESSURE: 167 MMHG | DIASTOLIC BLOOD PRESSURE: 96 MMHG | OXYGEN SATURATION: 96 %

## 2021-10-26 LAB
ALBUMIN SERPL ELPH-MCNC: 4.3 G/DL — SIGNIFICANT CHANGE UP (ref 3.3–5)
ALP SERPL-CCNC: 102 U/L — SIGNIFICANT CHANGE UP (ref 40–120)
ALT FLD-CCNC: 19 U/L — SIGNIFICANT CHANGE UP (ref 10–45)
ANION GAP SERPL CALC-SCNC: 13 MMOL/L — SIGNIFICANT CHANGE UP (ref 5–17)
APPEARANCE UR: CLEAR — SIGNIFICANT CHANGE UP
AST SERPL-CCNC: 20 U/L — SIGNIFICANT CHANGE UP (ref 10–40)
BACTERIA # UR AUTO: NEGATIVE — SIGNIFICANT CHANGE UP
BASOPHILS # BLD AUTO: 0.05 K/UL — SIGNIFICANT CHANGE UP (ref 0–0.2)
BASOPHILS NFR BLD AUTO: 0.6 % — SIGNIFICANT CHANGE UP (ref 0–2)
BILIRUB SERPL-MCNC: 0.4 MG/DL — SIGNIFICANT CHANGE UP (ref 0.2–1.2)
BILIRUB UR-MCNC: NEGATIVE — SIGNIFICANT CHANGE UP
BUN SERPL-MCNC: 25 MG/DL — HIGH (ref 7–23)
CALCIUM SERPL-MCNC: 9.8 MG/DL — SIGNIFICANT CHANGE UP (ref 8.4–10.5)
CHLORIDE SERPL-SCNC: 97 MMOL/L — SIGNIFICANT CHANGE UP (ref 96–108)
CO2 SERPL-SCNC: 27 MMOL/L — SIGNIFICANT CHANGE UP (ref 22–31)
COLOR SPEC: YELLOW — SIGNIFICANT CHANGE UP
CREAT SERPL-MCNC: 1.75 MG/DL — HIGH (ref 0.5–1.3)
DIFF PNL FLD: NEGATIVE — SIGNIFICANT CHANGE UP
EOSINOPHIL # BLD AUTO: 0.15 K/UL — SIGNIFICANT CHANGE UP (ref 0–0.5)
EOSINOPHIL NFR BLD AUTO: 1.9 % — SIGNIFICANT CHANGE UP (ref 0–6)
EPI CELLS # UR: 0 /HPF — SIGNIFICANT CHANGE UP
GLUCOSE SERPL-MCNC: 115 MG/DL — HIGH (ref 70–99)
GLUCOSE UR QL: NEGATIVE — SIGNIFICANT CHANGE UP
HCT VFR BLD CALC: 44.2 % — SIGNIFICANT CHANGE UP (ref 39–50)
HGB BLD-MCNC: 14 G/DL — SIGNIFICANT CHANGE UP (ref 13–17)
HYALINE CASTS # UR AUTO: 0 /LPF — SIGNIFICANT CHANGE UP (ref 0–2)
IMM GRANULOCYTES NFR BLD AUTO: 0.4 % — SIGNIFICANT CHANGE UP (ref 0–1.5)
KETONES UR-MCNC: NEGATIVE — SIGNIFICANT CHANGE UP
LEUKOCYTE ESTERASE UR-ACNC: NEGATIVE — SIGNIFICANT CHANGE UP
LYMPHOCYTES # BLD AUTO: 1.56 K/UL — SIGNIFICANT CHANGE UP (ref 1–3.3)
LYMPHOCYTES # BLD AUTO: 20.2 % — SIGNIFICANT CHANGE UP (ref 13–44)
MCHC RBC-ENTMCNC: 31 PG — SIGNIFICANT CHANGE UP (ref 27–34)
MCHC RBC-ENTMCNC: 31.7 GM/DL — LOW (ref 32–36)
MCV RBC AUTO: 97.8 FL — SIGNIFICANT CHANGE UP (ref 80–100)
MONOCYTES # BLD AUTO: 0.58 K/UL — SIGNIFICANT CHANGE UP (ref 0–0.9)
MONOCYTES NFR BLD AUTO: 7.5 % — SIGNIFICANT CHANGE UP (ref 2–14)
NEUTROPHILS # BLD AUTO: 5.36 K/UL — SIGNIFICANT CHANGE UP (ref 1.8–7.4)
NEUTROPHILS NFR BLD AUTO: 69.4 % — SIGNIFICANT CHANGE UP (ref 43–77)
NITRITE UR-MCNC: NEGATIVE — SIGNIFICANT CHANGE UP
NRBC # BLD: 0 /100 WBCS — SIGNIFICANT CHANGE UP (ref 0–0)
PH UR: 7 — SIGNIFICANT CHANGE UP (ref 5–8)
PLATELET # BLD AUTO: 207 K/UL — SIGNIFICANT CHANGE UP (ref 150–400)
POTASSIUM SERPL-MCNC: 3.9 MMOL/L — SIGNIFICANT CHANGE UP (ref 3.5–5.3)
POTASSIUM SERPL-SCNC: 3.9 MMOL/L — SIGNIFICANT CHANGE UP (ref 3.5–5.3)
PROT SERPL-MCNC: 7.8 G/DL — SIGNIFICANT CHANGE UP (ref 6–8.3)
PROT UR-MCNC: ABNORMAL
RBC # BLD: 4.52 M/UL — SIGNIFICANT CHANGE UP (ref 4.2–5.8)
RBC # FLD: 12.7 % — SIGNIFICANT CHANGE UP (ref 10.3–14.5)
RBC CASTS # UR COMP ASSIST: 1 /HPF — SIGNIFICANT CHANGE UP (ref 0–4)
SODIUM SERPL-SCNC: 137 MMOL/L — SIGNIFICANT CHANGE UP (ref 135–145)
SP GR SPEC: 1.01 — SIGNIFICANT CHANGE UP (ref 1.01–1.02)
TROPONIN T, HIGH SENSITIVITY RESULT: 66 NG/L — HIGH (ref 0–51)
TROPONIN T, HIGH SENSITIVITY RESULT: 71 NG/L — HIGH (ref 0–51)
UROBILINOGEN FLD QL: NEGATIVE — SIGNIFICANT CHANGE UP
WBC # BLD: 7.73 K/UL — SIGNIFICANT CHANGE UP (ref 3.8–10.5)
WBC # FLD AUTO: 7.73 K/UL — SIGNIFICANT CHANGE UP (ref 3.8–10.5)
WBC UR QL: 2 /HPF — SIGNIFICANT CHANGE UP (ref 0–5)

## 2021-10-26 PROCEDURE — 99284 EMERGENCY DEPT VISIT MOD MDM: CPT | Mod: 25

## 2021-10-26 PROCEDURE — 80053 COMPREHEN METABOLIC PANEL: CPT

## 2021-10-26 PROCEDURE — 71046 X-RAY EXAM CHEST 2 VIEWS: CPT | Mod: 26

## 2021-10-26 PROCEDURE — 84484 ASSAY OF TROPONIN QUANT: CPT

## 2021-10-26 PROCEDURE — 81001 URINALYSIS AUTO W/SCOPE: CPT

## 2021-10-26 PROCEDURE — 71046 X-RAY EXAM CHEST 2 VIEWS: CPT

## 2021-10-26 PROCEDURE — 83735 ASSAY OF MAGNESIUM: CPT

## 2021-10-26 PROCEDURE — 93005 ELECTROCARDIOGRAM TRACING: CPT

## 2021-10-26 PROCEDURE — 85025 COMPLETE CBC W/AUTO DIFF WBC: CPT

## 2021-10-26 NOTE — ED ADULT NURSE NOTE - CAS ELECT INFOMATION PROVIDED
Follow up with PMD, return for any worsening. Take BP medications as prescribed. Return for worsening CP, SOB, dizziness, weakness./DC instructions

## 2021-10-26 NOTE — ED PROVIDER NOTE - OBJECTIVE STATEMENT
71M pmh CKD, HTN, HLD, CAD presents to ED for elevated BP systolic 170 for several days, pt denies any symptoms presently but states he was concerned about his kidneys, denies dysuria, denies flank pain, denies any urinary symptoms. pt denies chest pain, sob, any other symptoms.

## 2021-10-26 NOTE — ED PROVIDER NOTE - PROGRESS NOTE DETAILS
Shawn Sanford, DO PGY-2: Pt's ekg consistent with prior, asymptomatic while in ED, troponin stable, will DC home w/ cardiology followup

## 2021-10-26 NOTE — ED PROVIDER NOTE - ATTENDING CONTRIBUTION TO CARE
71 year old male with history of CKD, CAD, HTN, HLD presented to ED with elevated BP. Pt asymptomatic. EKG consistent with PACs. on exam: CN 2-12 grossly intact, no lateralizing findings. Lungs CTA, abdomen soft, non-tender, non-distended. Likely essential HTN. Plan: Labs, cxr, EKG. Reassess

## 2021-10-26 NOTE — ED PROVIDER NOTE - NSICDXPASTMEDICALHX_GEN_ALL_CORE_FT
PAST MEDICAL HISTORY:  CAD (coronary artery disease)     Cervical disc disease with myelopathy     Diabetes not on meds at this time due to recent 50 lb weight loss    DVT (deep venous thrombosis) left soleal DVT- August 2014- On coumadin    Dyslipidemia     HTN (Hypertension), Benign     Lumbar disc disease with radiculopathy     Osteoarthritis

## 2021-10-26 NOTE — ED PROVIDER NOTE - PHYSICAL EXAMINATION
CONSTITUTIONAL: well-appearing, in NAD  SKIN: Warm dry, normal skin turgor  HEAD: NCAT  EYES: EOMI, PERRLA, no scleral icterus, conjunctiva pink  ENT: normal pharynx with no erythema or exudates  NECK: Supple; non tender. Full ROM.  CARD: irregular, no murmurs.  RESP: clear to ausculation b/l. No crackles or wheezing.  ABD: soft, non-tender, non-distended, no rebound or guarding.  MSK: 1+ pedal edema, no calf tenderness  PSYCH: Cooperative, appropriate.

## 2021-10-26 NOTE — ED PROVIDER NOTE - CLINICAL SUMMARY MEDICAL DECISION MAKING FREE TEXT BOX
71M pmh CKD, HTN, HLD, CAD presents to ED for elevated BP systolic 170 for several days, likely asymptomatic hypertension urgency, pt has irregular heart rhythm different from EKG 4 yrs ago, consider CDU for echocardiogram. 71M pmh CKD, HTN, HLD, CAD presents to ED for elevated BP systolic 170 for several days, likely asymptomatic hypertension urgency, pt has irregular heart rhythm similar to pattern on EKG 4 yrs ago, consider CDU for echocardiogram.

## 2021-10-26 NOTE — ED PROVIDER NOTE - NSICDXPASTSURGICALHX_GEN_ALL_CORE_FT
PAST SURGICAL HISTORY:  S/P knee surgery right & left    S/P lumbar laminectomy     Stented coronary artery 2 Drug elluding stents

## 2021-10-26 NOTE — ED PROVIDER NOTE - PATIENT PORTAL LINK FT
You can access the FollowMyHealth Patient Portal offered by [facility] by registering at the following website: [email or web address]. By joining [facility]’s FollowMyHealth portal, you will also be able to view your health information using other applications (apps) compatible with our system.

## 2021-10-26 NOTE — ED PROVIDER NOTE - NSFOLLOWUPINSTRUCTIONS_ED_ALL_ED_FT
You were seen in the Emergency Department for high blood pressure.    Follow up with your cardiologist within the next three days.     Follow up with your primary care provider within 3-5 days.    Take your medications as prescribed    If you have chest pain, shortness of breath, if you have fever, chills, nausea, vomiting, new or worsening pain, or if you have any new symptoms return to the Emergency Department.

## 2021-10-26 NOTE — ED ADULT NURSE NOTE - OBJECTIVE STATEMENT
70 yo male AAOX3 with hx of HTN presents to ED for BP 190s/90s at home. As per pt, checks BP regularly, tonight noticed BP was 190s/90s so took as needed dose of BP medication (unsure which one). Patient usually takes BP meds in the morning (pt unsure what name). Patient ambulatory from triage. Denies dizziness, headache, blurry vision, light sensitivity. No c/o SOB, weakness, CP, cough, fevers/chills.   Safety measures maintained with bed in low position and side rails up.

## 2021-11-15 ENCOUNTER — APPOINTMENT (OUTPATIENT)
Dept: UROLOGY | Facility: CLINIC | Age: 71
End: 2021-11-15
Payer: MEDICARE

## 2021-11-15 PROCEDURE — 99212 OFFICE O/P EST SF 10 MIN: CPT

## 2021-11-15 NOTE — HISTORY OF PRESENT ILLNESS
[FreeTextEntry1] : The patient is a 71 year old male presenting today for elevated PSA. \par He brought lab work from another physician and it showed that his PSA on 10/29/21 was 13.3 ng/mL. \par His PSA on 2/23/21 was 8.17 ng/mL. \par Recently, he went to the emergency room and said that his blood pressure was very high. \par He has a positive family history of prostate cancer. \par \par His MRI of the prostate from 2/28/2020 demonstrated:\par Impression: \par No MRI suspicious prostate lesions\par *PIRADS 1 \par Moderate nodular hyperplasia of the transition zone for an overall gland volume of 84 cc\par \par Blood work today includes prostata ca  screen\par \par I would like the patient to repeat the MRI of the prostate. \par \par The patient will return to the office a few days after the MRI to review the results.

## 2021-11-15 NOTE — ADDENDUM
[FreeTextEntry1] : I, Luz Maria Davidson, acted as the sole scribe for Dr. Db Baxter on 11/15/2021.

## 2021-11-15 NOTE — ASSESSMENT
[FreeTextEntry1] : The patient is a 71 year old male presenting today for elevated PSA. \par He brought lab work from another physician and it showed that his PSA on 10/29/21 was 13.3 ng/mL. \par His PSA on 2/23/21 was 8.17 ng/mL. \par Recently, he went to the emergency room and said that his blood pressure was very high. \par He has a positive family history of prostate cancer. \par \par His MRI of the prostate from 2/28/2020 demonstrated:\par Impression: \par No MRI suspicious prostate lesions\par *PIRADS 1 \par Moderate nodular hyperplasia of the transition zone for an overall gland volume of 84 cc\par \par I would like the patient to repeat the MRI of the prostate. \par \par The patient will return to the office a few days after the MRI to review the results. \par \par I spent 15 minutes with the patient.

## 2021-11-18 LAB — PSA SERPL-MCNC: 11.7 NG/ML

## 2021-11-20 ENCOUNTER — APPOINTMENT (OUTPATIENT)
Dept: MRI IMAGING | Facility: IMAGING CENTER | Age: 71
End: 2021-11-20
Payer: MEDICARE

## 2021-11-20 ENCOUNTER — RESULT REVIEW (OUTPATIENT)
Age: 71
End: 2021-11-20

## 2021-11-20 ENCOUNTER — OUTPATIENT (OUTPATIENT)
Dept: OUTPATIENT SERVICES | Facility: HOSPITAL | Age: 71
LOS: 1 days | End: 2021-11-20
Payer: MEDICARE

## 2021-11-20 DIAGNOSIS — R97.20 ELEVATED PROSTATE SPECIFIC ANTIGEN [PSA]: ICD-10-CM

## 2021-11-20 DIAGNOSIS — Z98.89 OTHER SPECIFIED POSTPROCEDURAL STATES: Chronic | ICD-10-CM

## 2021-11-20 DIAGNOSIS — Z95.5 PRESENCE OF CORONARY ANGIOPLASTY IMPLANT AND GRAFT: Chronic | ICD-10-CM

## 2021-11-20 PROCEDURE — 72197 MRI PELVIS W/O & W/DYE: CPT | Mod: 26,MG

## 2021-11-20 PROCEDURE — G1004: CPT

## 2021-11-20 PROCEDURE — 76498 UNLISTED MR PROCEDURE: CPT

## 2021-11-20 PROCEDURE — 76498P: CUSTOM | Mod: 26,MH

## 2021-11-20 PROCEDURE — A9585: CPT

## 2021-11-20 PROCEDURE — 72197 MRI PELVIS W/O & W/DYE: CPT | Mod: MG

## 2021-11-22 ENCOUNTER — APPOINTMENT (OUTPATIENT)
Dept: UROLOGY | Facility: CLINIC | Age: 71
End: 2021-11-22
Payer: MEDICARE

## 2021-11-22 PROCEDURE — 99212 OFFICE O/P EST SF 10 MIN: CPT

## 2021-11-22 NOTE — ASSESSMENT
[FreeTextEntry1] : TThe patient is a 71 year old male presenting today to review MRI results. \par His PSA on 11/15/21 was 11.7 ng/mL. \par He is feeling well and offers no complaints. \par \par The MRI of the prostate from 11/20/21 demonstrated:\par FINDINGS:\par Size: 6.0 x 5.2 x 7.2 cm [transverse x AP x CC] cm.   Volume: 116 cc.   PSA density: 0.11 ng/mL?\par Hemorrhage: none.\par Central gland: Enlarged central prostatic hypertrophy. No MRI suspicious lesions.\par Peripheral zone: Lesion in the peripheral zone left apex and right base as described above.\par \par LESION: #1\par Location: Left, posterior lateral and medial, [id number], peripheral zone.\par Slice#: Series 6, Image 12.\par Size (transverse, AP, CC): 20 x 11 x 17 mm.\par T2-WI: 3 - Heterogeneous signal intensity or non-circumscribed, rounded, moderate hypointensity (including others that do not qualify as 2, 4, or 5).\par DWI: 5 - Focal markedly hypointense on ADC and markedly hyperintense on high b-value DWI; greater than 1.5 cm in greatest dimension.\par DCE: Positive - focal, and; earlier than enhancement of adjacent normal prostatic tissues, and; corresponds to suspicious finding on T2W and/or DWI.\par Extra-prostatic extension: Tumor bulges, deforms or obscures (loss of) capsule with or without neurovascular bundle thickening.\par Findings are new as compared with the prior study.\par PI-RADS Assessment Category: 5, Very High\par \par LESION: #2\par Location: Right, posterior, base, peripheral zone.\par Slice#: Series 6, Image 19.\par Size (transverse, AP, CC): 22 x 7 x 7 mm.\par T2-WI: 3 - Heterogeneous signal intensity or non-circumscribed, rounded, moderate hypointensity (including others that do not qualify as 2, 4, or 5).\par DWI: 5 - Focal markedly hypointense on ADC and markedly hyperintense on high b-value DWI; greater than 1.5 cm in greatest dimension.\par DCE: Positive - focal, and; earlier than enhancement of adjacent normal prostatic tissues, and; corresponds to suspicious finding on T2W and/or DWI.\par Extra-prostatic extension: None.\par Findings are new as compared with the prior study.\par PI-RADS Assessment Category: 5, Very High\par \par Capsule: ZAHRA as above.\par Neurovascular bundle: Left neurovascular bundle involvement cannot be excluded.\par Seminal vesicles:  No seminal vesicle invasion.\par Lymph nodes: No pelvic adenopathy.\par Bones: No suspicious lesions identified.\par Urinary bladder: unremarkable.\par Other: None.\par \par IMPRESSION:\par Prostate lesions as detailed above.\par *PIRADS 5 - Very high (clinically significant cancer is highly likely to be present)\par \par Central prostatic hypertrophy.\par \par I recommend the patient have a biopsy of the prostate. I have discussed the case with Dr. Whyte and he will schedule the patient for a biopsy as soon as possible. \par \par The patient will return to the office for the prostate biopsy and then one week later to review the biopsy results. \par \par I spent 25 minutes with the patient.

## 2021-11-22 NOTE — HISTORY OF PRESENT ILLNESS
[FreeTextEntry1] : TThe patient is a 71 year old male presenting today to review MRI results. \par His PSA on 11/15/21 was 11.7 ng/mL. \par He is feeling well and offers no complaints. \par \par The MRI of the prostate from 11/20/21 demonstrated:\par FINDINGS:\par Size: 6.0 x 5.2 x 7.2 cm [transverse x AP x CC] cm.   Volume: 116 cc.   PSA density: 0.11 ng/mL?\par Hemorrhage: none.\par Central gland: Enlarged central prostatic hypertrophy. No MRI suspicious lesions.\par Peripheral zone: Lesion in the peripheral zone left apex and right base as described above.\par \par LESION: #1\par Location: Left, posterior lateral and medial, [id number]-jq-zbwi, peripheral zone.\par Slice#: Series 6, Image 12.\par Size (transverse, AP, CC): 20 x 11 x 17 mm.\par T2-WI: 3 - Heterogeneous signal intensity or non-circumscribed, rounded, moderate hypointensity (including others that do not qualify as 2, 4, or 5).\par DWI: 5 - Focal markedly hypointense on ADC and markedly hyperintense on high b-value DWI; greater than 1.5 cm in greatest dimension.\par DCE: Positive - focal, and; earlier than enhancement of adjacent normal prostatic tissues, and; corresponds to suspicious finding on T2W and/or DWI.\par Extra-prostatic extension: Tumor bulges, deforms or obscures (loss of) capsule with or without neurovascular bundle thickening.\par Findings are new as compared with the prior study.\par PI-RADS Assessment Category: 5, Very High\par \par LESION: #2\par Location: Right, posterior, base, peripheral zone.\par Slice#: Series 6, Image 19.\par Size (transverse, AP, CC): 22 x 7 x 7 mm.\par T2-WI: 3 - Heterogeneous signal intensity or non-circumscribed, rounded, moderate hypointensity (including others that do not qualify as 2, 4, or 5).\par DWI: 5 - Focal markedly hypointense on ADC and markedly hyperintense on high b-value DWI; greater than 1.5 cm in greatest dimension.\par DCE: Positive - focal, and; earlier than enhancement of adjacent normal prostatic tissues, and; corresponds to suspicious finding on T2W and/or DWI.\par Extra-prostatic extension: None.\par Findings are new as compared with the prior study.\par PI-RADS Assessment Category: 5, Very High\par \par Capsule: ZAHRA as above.\par Neurovascular bundle: Left neurovascular bundle involvement cannot be excluded.\par Seminal vesicles:  No seminal vesicle invasion.\par Lymph nodes: No pelvic adenopathy.\par Bones: No suspicious lesions identified.\par Urinary bladder: unremarkable.\par Other: None.\par \par IMPRESSION:\par Prostate lesions as detailed above.\par *PIRADS 5 - Very high (clinically significant cancer is highly likely to be present)\par \par Central prostatic hypertrophy.\par \par I recommend the patient have a biopsy of the prostate. I have discussed the case with Dr. Whyte and he will schedule the patient for a biopsy as soon as possible. \par \par The patient will return to the office for the prostate biopsy and then one week later to review the biopsy results.

## 2021-11-22 NOTE — ADDENDUM
[FreeTextEntry1] : I, Luz Maria Davidson, acted as the sole scribe for Dr. Db Baxter on 11/22/2021.

## 2021-12-01 ENCOUNTER — NON-APPOINTMENT (OUTPATIENT)
Age: 71
End: 2021-12-01

## 2021-12-03 ENCOUNTER — OUTPATIENT (OUTPATIENT)
Dept: OUTPATIENT SERVICES | Facility: HOSPITAL | Age: 71
LOS: 1 days | End: 2021-12-03
Payer: MEDICARE

## 2021-12-03 ENCOUNTER — APPOINTMENT (OUTPATIENT)
Dept: UROLOGY | Facility: CLINIC | Age: 71
End: 2021-12-03
Payer: MEDICARE

## 2021-12-03 VITALS — SYSTOLIC BLOOD PRESSURE: 183 MMHG | DIASTOLIC BLOOD PRESSURE: 92 MMHG | HEART RATE: 86 BPM

## 2021-12-03 VITALS — DIASTOLIC BLOOD PRESSURE: 92 MMHG | SYSTOLIC BLOOD PRESSURE: 162 MMHG | RESPIRATION RATE: 16 BRPM | HEART RATE: 80 BPM

## 2021-12-03 DIAGNOSIS — R35.0 FREQUENCY OF MICTURITION: ICD-10-CM

## 2021-12-03 DIAGNOSIS — Z95.5 PRESENCE OF CORONARY ANGIOPLASTY IMPLANT AND GRAFT: Chronic | ICD-10-CM

## 2021-12-03 DIAGNOSIS — Z98.89 OTHER SPECIFIED POSTPROCEDURAL STATES: Chronic | ICD-10-CM

## 2021-12-03 DIAGNOSIS — R97.20 ELEVATED PROSTATE, SPECIFIC ANTIGEN [PSA]: ICD-10-CM

## 2021-12-03 DIAGNOSIS — R97.20 ELEVATED PROSTATE SPECIFIC ANTIGEN [PSA]: ICD-10-CM

## 2021-12-03 PROCEDURE — 76872 US TRANSRECTAL: CPT | Mod: 26

## 2021-12-03 PROCEDURE — 76942 ECHO GUIDE FOR BIOPSY: CPT | Mod: 59

## 2021-12-03 PROCEDURE — 76377 3D RENDER W/INTRP POSTPROCES: CPT | Mod: 26

## 2021-12-03 PROCEDURE — 55700: CPT

## 2021-12-03 PROCEDURE — 76942 ECHO GUIDE FOR BIOPSY: CPT | Mod: 26,59

## 2021-12-03 PROCEDURE — 55700: CPT | Mod: 22

## 2021-12-03 PROCEDURE — 76872 US TRANSRECTAL: CPT

## 2021-12-06 ENCOUNTER — NON-APPOINTMENT (OUTPATIENT)
Age: 71
End: 2021-12-06

## 2021-12-12 LAB — CORE LAB BIOPSY: NORMAL

## 2021-12-14 NOTE — PROVIDER CONTACT NOTE (OTHER) - BACKGROUND
Pt adm for hypertensive urgency, hx DM 2, CAD Libtayo Pregnancy And Lactation Text: This medication is contraindicated in pregnancy and when breast feeding.

## 2022-01-18 ENCOUNTER — APPOINTMENT (OUTPATIENT)
Dept: UROLOGY | Facility: CLINIC | Age: 72
End: 2022-01-18
Payer: MEDICARE

## 2022-01-18 PROCEDURE — 99212 OFFICE O/P EST SF 10 MIN: CPT

## 2022-01-18 NOTE — HISTORY OF PRESENT ILLNESS
[FreeTextEntry1] : The patient is a 71 year old male presenting today for prostate biopsy results. \par His PSA on 11/15/21 was 11.7 ng/mL. \par His prostate biopsy from 12/3/21 showed benign prostate hypertrophy with some areas of features of nonspecific granulomatous prostatitis. \par He reports that after his biopsy he had gross hematuria. \par Nocturia 1-2 times over night. He reports he previously had nocturia every hour at night. \par The patient takes Tamsulosin 0.4 mg, two capsules at bedtime. \par \par His MRI of the prostate from 11/20/21 demonstrated:\par FINDINGS:\par Size: 6.0 x 5.2 x 7.2 cm [transverse x AP x CC] cm.   Volume: 116 cc.   PSA density: 0.11 ng/mL\par \par I would like the patient to have Urodynamics testing. I also advised him that it may be indicated for him to have a TURP. \par \par \par The patient will return to the office for UDS.

## 2022-01-18 NOTE — ADDENDUM
[FreeTextEntry1] : I, Luz Maria Davidson, acted as the sole scribe for Dr. Db Baxter on 01/18/2022.

## 2022-01-18 NOTE — ASSESSMENT
[FreeTextEntry1] : The patient is a 71 year old male presenting today for prostate biopsy results. \par His PSA on 11/15/21 was 11.7 ng/mL. \par His prostate biopsy from 12/3/21 showed benign prostate hypertrophy with some areas of features of nonspecific granulomatous prostatitis. \par He reports that after his biopsy he had gross hematuria. \par Nocturia 1-2 times over night. He reports he previously had nocturia every hour at night. \par The patient takes Tamsulosin 0.4 mg, two capsules at bedtime. \par \par His MRI of the prostate from 11/20/21 demonstrated:\par FINDINGS:\par Size: 6.0 x 5.2 x 7.2 cm [transverse x AP x CC] cm.   Volume: 116 cc.   PSA density: 0.11 ng/mL\par \par I would like the patient to have Urodynamics testing. I also advised him that it may be indicated for him to have a TURP. \par \par \par The patient will return to the office for UDS. \par \par I spent 15 minutes with the patient.

## 2022-01-27 ENCOUNTER — OUTPATIENT (OUTPATIENT)
Dept: OUTPATIENT SERVICES | Facility: HOSPITAL | Age: 72
LOS: 1 days | End: 2022-01-27
Payer: MEDICARE

## 2022-01-27 ENCOUNTER — APPOINTMENT (OUTPATIENT)
Dept: UROLOGY | Facility: CLINIC | Age: 72
End: 2022-01-27
Payer: MEDICARE

## 2022-01-27 VITALS — HEART RATE: 84 BPM | SYSTOLIC BLOOD PRESSURE: 158 MMHG | DIASTOLIC BLOOD PRESSURE: 76 MMHG

## 2022-01-27 VITALS
RESPIRATION RATE: 18 BRPM | DIASTOLIC BLOOD PRESSURE: 87 MMHG | OXYGEN SATURATION: 98 % | HEART RATE: 74 BPM | TEMPERATURE: 98.4 F | SYSTOLIC BLOOD PRESSURE: 178 MMHG

## 2022-01-27 DIAGNOSIS — R35.0 FREQUENCY OF MICTURITION: ICD-10-CM

## 2022-01-27 DIAGNOSIS — Z98.89 OTHER SPECIFIED POSTPROCEDURAL STATES: Chronic | ICD-10-CM

## 2022-01-27 DIAGNOSIS — N32.0 BLADDER-NECK OBSTRUCTION: ICD-10-CM

## 2022-01-27 DIAGNOSIS — Z95.5 PRESENCE OF CORONARY ANGIOPLASTY IMPLANT AND GRAFT: Chronic | ICD-10-CM

## 2022-01-27 PROCEDURE — 51728 CYSTOMETROGRAM W/VP: CPT

## 2022-01-27 PROCEDURE — 51784 ANAL/URINARY MUSCLE STUDY: CPT | Mod: 26

## 2022-01-27 PROCEDURE — 51741 ELECTRO-UROFLOWMETRY FIRST: CPT

## 2022-01-27 PROCEDURE — 51728 CYSTOMETROGRAM W/VP: CPT | Mod: 26

## 2022-01-27 PROCEDURE — 51797 INTRAABDOMINAL PRESSURE TEST: CPT | Mod: 26

## 2022-01-27 PROCEDURE — 51798 US URINE CAPACITY MEASURE: CPT

## 2022-01-27 PROCEDURE — 51784 ANAL/URINARY MUSCLE STUDY: CPT

## 2022-01-27 PROCEDURE — 51797 INTRAABDOMINAL PRESSURE TEST: CPT

## 2022-01-27 NOTE — HISTORY OF PRESENT ILLNESS
[FreeTextEntry1] : HPI: Mr. Sullivan is a 70 yo M with an elevated PSA to 11.7 ng/mL who underwent a prostate biopsy on 12/3/2021 with  Dr. Duff and found to have BPH and nonspecific granulomatous prostatitis. He currently has some continued nocturia 1-2x at night, previously has had nocturia qhour at night, he's currently on flomax 0.8mg QHS and a number of herbal supplements he has purchased.\par \par Based on his MRI from 11/2021, found to have a volume of 116 cc although TRUS sizing around 90g. He underwent UDS today with synergic voiding, no overt DOs and blunted flow curve with minimal PVR (20cc) consisted with SANCHEZ. \par \par Anticoagulation: None\par All: NKDA\par Social: never smoker, no EtOH\par PMHx: CAD, Eelevated PSA, HTN, HLD\par FHx: acute MI, HTN, obesity\par PSHx: cervical spine surgery, CABG, quadricepsplasty\par \par Imaging: MRI as above\par  [Urinary Incontinence] : no urinary incontinence [Urinary Retention] : no urinary retention [Urinary Urgency] : no urinary urgency [Urinary Frequency] : no urinary frequency

## 2022-06-06 ENCOUNTER — APPOINTMENT (OUTPATIENT)
Dept: NEUROSURGERY | Facility: CLINIC | Age: 72
End: 2022-06-06
Payer: MEDICARE

## 2022-06-06 VITALS
HEART RATE: 73 BPM | HEIGHT: 73 IN | DIASTOLIC BLOOD PRESSURE: 96 MMHG | OXYGEN SATURATION: 94 % | BODY MASS INDEX: 36.05 KG/M2 | WEIGHT: 272 LBS | SYSTOLIC BLOOD PRESSURE: 175 MMHG

## 2022-06-06 PROCEDURE — 99212 OFFICE O/P EST SF 10 MIN: CPT

## 2022-06-06 NOTE — PHYSICAL EXAM
[FreeTextEntry1] : Pt gait is mildly labored and asymmetric, but alternating and steady.\par Motor is 5/5 except right dorsiflexion 2-3/5.\par  [General Appearance - Alert] : alert [General Appearance - In No Acute Distress] : in no acute distress

## 2022-06-06 NOTE — ASSESSMENT
[FreeTextEntry1] : Pt is stable after lumbar and cervical surgery.  He has some pains and still with some issues with left leg giving way.  We could image and/or send for therapy.  Pt prefers to do his own therapy for now and hold on imaging.\par \par Return in 6 months, or as needed.

## 2022-06-06 NOTE — HISTORY OF PRESENT ILLNESS
[FreeTextEntry1] : MIKKI CHO is a very pleasant 68 year left-handed male who is here for a follow up visit. He is s/p C3-4, C4-5 ACDFI from 12/22/2014 and L1-2 lami with excision of large extruded disc on 8/12/2014. He comes to the office today reporting feeling "great". As far as his neck and back go, he feels well. He denies numbness, tingling, weakness in extremities. He denies problems with manual dexterity and denies bowel and bladder problems.\par \par Pt returns feeling that legs are tight around his knee caps.  Sometimes get pain in back but now it is better. Arms are good, no neck pain.  Says his left leg gives out on him (had some previous weakness here).\par Dr. Bc Andrews is his current cardiologist.

## 2022-07-28 ENCOUNTER — APPOINTMENT (OUTPATIENT)
Dept: UROLOGY | Facility: CLINIC | Age: 72
End: 2022-07-28

## 2022-07-28 DIAGNOSIS — N32.0 BLADDER-NECK OBSTRUCTION: ICD-10-CM

## 2022-07-28 PROCEDURE — 51798 US URINE CAPACITY MEASURE: CPT

## 2022-07-28 PROCEDURE — 99212 OFFICE O/P EST SF 10 MIN: CPT

## 2022-07-28 NOTE — HISTORY OF PRESENT ILLNESS
[FreeTextEntry1] : HPI: Mr. Sullivan is a 72 yo M with an elevated PSA to 11.7 ng/mL who underwent a prostate biopsy on 12/3/2021 with  Dr. Duff and found to have BPH and nonspecific granulomatous prostatitis. He currently has some continued nocturia 1-2x at night, previously has had nocturia qhour at night, he's currently on flomax 0.8mg QHS and a number of herbal supplements he has purchased.\par \par Based on his MRI from 11/2021, found to have a volume of 116 cc although TRUS sizing around 90g. He underwent UDS today with synergic voiding, no overt DOs and blunted flow curve with minimal PVR (20cc) consisted with SANCHEZ. \par \par Anticoagulation: None\par All: NKDA\par Social: never smoker, no EtOH\par PMHx: CAD, Eelevated PSA, HTN, HLD\par FHx: acute MI, HTN, obesity\par PSHx: cervical spine surgery, CABG, quadricepsplasty\par \par Imaging: MRI as above\par \par 7/28:\par Here today with continued bladder outlet obstruction. Inconsistently taking the flomax and his supplements. Still voiding every 3-4 hours even at night. PVR Today 175 cc. Has been inconsistent with his urination and taking of medication. Cardiologist: Bc Andrews [address] [place] [address]\par  [Urinary Incontinence] : no urinary incontinence [Urinary Retention] : no urinary retention [Urinary Urgency] : no urinary urgency [Urinary Frequency] : no urinary frequency

## 2022-08-10 ENCOUNTER — OUTPATIENT (OUTPATIENT)
Dept: OUTPATIENT SERVICES | Facility: HOSPITAL | Age: 72
LOS: 1 days | End: 2022-08-10
Payer: MEDICARE

## 2022-08-10 VITALS
WEIGHT: 268.3 LBS | DIASTOLIC BLOOD PRESSURE: 95 MMHG | SYSTOLIC BLOOD PRESSURE: 163 MMHG | TEMPERATURE: 98 F | RESPIRATION RATE: 16 BRPM | HEIGHT: 78 IN | OXYGEN SATURATION: 97 % | HEART RATE: 60 BPM

## 2022-08-10 DIAGNOSIS — Z98.89 OTHER SPECIFIED POSTPROCEDURAL STATES: Chronic | ICD-10-CM

## 2022-08-10 DIAGNOSIS — I10 ESSENTIAL (PRIMARY) HYPERTENSION: ICD-10-CM

## 2022-08-10 DIAGNOSIS — N32.0 BLADDER-NECK OBSTRUCTION: ICD-10-CM

## 2022-08-10 DIAGNOSIS — Z95.5 PRESENCE OF CORONARY ANGIOPLASTY IMPLANT AND GRAFT: Chronic | ICD-10-CM

## 2022-08-10 DIAGNOSIS — Z01.818 ENCOUNTER FOR OTHER PREPROCEDURAL EXAMINATION: ICD-10-CM

## 2022-08-10 DIAGNOSIS — I80.229 PHLEBITIS AND THROMBOPHLEBITIS OF UNSPECIFIED POPLITEAL VEIN: ICD-10-CM

## 2022-08-10 DIAGNOSIS — Z98.890 OTHER SPECIFIED POSTPROCEDURAL STATES: Chronic | ICD-10-CM

## 2022-08-10 DIAGNOSIS — E11.9 TYPE 2 DIABETES MELLITUS WITHOUT COMPLICATIONS: ICD-10-CM

## 2022-08-10 DIAGNOSIS — I25.10 ATHEROSCLEROTIC HEART DISEASE OF NATIVE CORONARY ARTERY WITHOUT ANGINA PECTORIS: ICD-10-CM

## 2022-08-10 DIAGNOSIS — Z29.9 ENCOUNTER FOR PROPHYLACTIC MEASURES, UNSPECIFIED: ICD-10-CM

## 2022-08-10 LAB
A1C WITH ESTIMATED AVERAGE GLUCOSE RESULT: 6.8 % — HIGH (ref 4–5.6)
ANION GAP SERPL CALC-SCNC: 10 MMOL/L — SIGNIFICANT CHANGE UP (ref 5–17)
BLD GP AB SCN SERPL QL: NEGATIVE — SIGNIFICANT CHANGE UP
BUN SERPL-MCNC: 25 MG/DL — HIGH (ref 7–23)
CALCIUM SERPL-MCNC: 9.7 MG/DL — SIGNIFICANT CHANGE UP (ref 8.4–10.5)
CHLORIDE SERPL-SCNC: 96 MMOL/L — SIGNIFICANT CHANGE UP (ref 96–108)
CO2 SERPL-SCNC: 31 MMOL/L — SIGNIFICANT CHANGE UP (ref 22–31)
CREAT SERPL-MCNC: 2 MG/DL — HIGH (ref 0.5–1.3)
EGFR: 35 ML/MIN/1.73M2 — LOW
ESTIMATED AVERAGE GLUCOSE: 148 MG/DL — HIGH (ref 68–114)
GLUCOSE SERPL-MCNC: 113 MG/DL — HIGH (ref 70–99)
HCT VFR BLD CALC: 44 % — SIGNIFICANT CHANGE UP (ref 39–50)
HGB BLD-MCNC: 13.8 G/DL — SIGNIFICANT CHANGE UP (ref 13–17)
MCHC RBC-ENTMCNC: 30.3 PG — SIGNIFICANT CHANGE UP (ref 27–34)
MCHC RBC-ENTMCNC: 31.4 GM/DL — LOW (ref 32–36)
MCV RBC AUTO: 96.7 FL — SIGNIFICANT CHANGE UP (ref 80–100)
NRBC # BLD: 0 /100 WBCS — SIGNIFICANT CHANGE UP (ref 0–0)
PLATELET # BLD AUTO: 222 K/UL — SIGNIFICANT CHANGE UP (ref 150–400)
POTASSIUM SERPL-MCNC: 4.2 MMOL/L — SIGNIFICANT CHANGE UP (ref 3.5–5.3)
POTASSIUM SERPL-SCNC: 4.2 MMOL/L — SIGNIFICANT CHANGE UP (ref 3.5–5.3)
RBC # BLD: 4.55 M/UL — SIGNIFICANT CHANGE UP (ref 4.2–5.8)
RBC # FLD: 13.6 % — SIGNIFICANT CHANGE UP (ref 10.3–14.5)
RH IG SCN BLD-IMP: POSITIVE — SIGNIFICANT CHANGE UP
SODIUM SERPL-SCNC: 137 MMOL/L — SIGNIFICANT CHANGE UP (ref 135–145)
WBC # BLD: 7.85 K/UL — SIGNIFICANT CHANGE UP (ref 3.8–10.5)
WBC # FLD AUTO: 7.85 K/UL — SIGNIFICANT CHANGE UP (ref 3.8–10.5)

## 2022-08-10 PROCEDURE — 86900 BLOOD TYPING SEROLOGIC ABO: CPT

## 2022-08-10 PROCEDURE — G0463: CPT

## 2022-08-10 PROCEDURE — 80048 BASIC METABOLIC PNL TOTAL CA: CPT

## 2022-08-10 PROCEDURE — 36415 COLL VENOUS BLD VENIPUNCTURE: CPT

## 2022-08-10 PROCEDURE — 87086 URINE CULTURE/COLONY COUNT: CPT

## 2022-08-10 PROCEDURE — 85027 COMPLETE CBC AUTOMATED: CPT

## 2022-08-10 PROCEDURE — 86901 BLOOD TYPING SEROLOGIC RH(D): CPT

## 2022-08-10 PROCEDURE — 83036 HEMOGLOBIN GLYCOSYLATED A1C: CPT

## 2022-08-10 PROCEDURE — 86850 RBC ANTIBODY SCREEN: CPT

## 2022-08-10 RX ORDER — POTASSIUM CHLORIDE 20 MEQ
1 PACKET (EA) ORAL
Qty: 0 | Refills: 0 | DISCHARGE

## 2022-08-10 RX ORDER — CHLORHEXIDINE GLUCONATE 213 G/1000ML
1 SOLUTION TOPICAL DAILY
Refills: 0 | Status: DISCONTINUED | OUTPATIENT
Start: 2022-08-10 | End: 2022-08-24

## 2022-08-10 RX ORDER — RAMIPRIL 5 MG
2 CAPSULE ORAL
Qty: 0 | Refills: 0 | DISCHARGE

## 2022-08-10 RX ORDER — AMLODIPINE BESYLATE 2.5 MG/1
1 TABLET ORAL
Qty: 0 | Refills: 0 | DISCHARGE

## 2022-08-10 RX ORDER — CEFAZOLIN SODIUM 1 G
2000 VIAL (EA) INJECTION ONCE
Refills: 0 | Status: DISCONTINUED | OUTPATIENT
Start: 2022-08-10 | End: 2022-08-26

## 2022-08-10 RX ORDER — LIDOCAINE HCL 20 MG/ML
0.2 VIAL (ML) INJECTION ONCE
Refills: 0 | Status: DISCONTINUED | OUTPATIENT
Start: 2022-08-10 | End: 2022-08-24

## 2022-08-10 RX ORDER — SODIUM CHLORIDE 9 MG/ML
3 INJECTION INTRAMUSCULAR; INTRAVENOUS; SUBCUTANEOUS EVERY 8 HOURS
Refills: 0 | Status: DISCONTINUED | OUTPATIENT
Start: 2022-08-10 | End: 2022-08-24

## 2022-08-10 NOTE — H&P PST ADULT - NSICDXPASTMEDICALHX_GEN_ALL_CORE_FT
PAST MEDICAL HISTORY:  BPH (benign prostatic hyperplasia)     CAD (coronary artery disease)     Cervical disc disease with myelopathy     Diabetes not on meds at this time due to recent 50 lb weight loss    DVT (deep venous thrombosis) left soleal DVT- August 2014- On coumadin    Dyslipidemia     HTN (Hypertension), Benign     Lumbar disc disease with radiculopathy     Osteoarthritis     Stage 3 chronic kidney disease

## 2022-08-10 NOTE — H&P PST ADULT - OTHER CARE PROVIDERS
Dr. Bc Andrews ([facility]) [phone] (appt on 8/16 for Card. [facility]) Dr. Bc Andrews (card) [phone] (appt on 8/16 for Card. clr), Dr. Lyle (Neph) [phone] (appt on 8/11)

## 2022-08-10 NOTE — H&P PST ADULT - NSICDXPASTSURGICALHX_GEN_ALL_CORE_FT
PAST SURGICAL HISTORY:  H/O prostate biopsy 12/2021    S/P cervical discectomy 2014    S/P colonoscopy     S/P knee surgery right & left    S/P lumbar laminectomy     Stented coronary artery 2 Drug elluding stents

## 2022-08-10 NOTE — H&P PST ADULT - HISTORY OF PRESENT ILLNESS
71 yr old male with PMH of CAD s/p stents x2 (on ASA) 2010, HTN, HLD, DM type2, cervical radiculopathy s/p cervical discectomy 2014, lumbar spinal stenosis s/p lumbar laminectomy 2014, DVT (deep venous thrombosis: left soleal DVT- August 2014- On coumadin), CKD stage 3    62 yr old male with history of lumbar disc disease - s/p lumbar laminectomy & discectomy in July 2014, with residual numbness to right leg, and both arms, was found to have C 3-5 cervical stenosis. Now here for ACDF on 12/22/14. 71 yr old male (poor historian) with PMH of CAD s/p stents x2 (on ASA) 2010, HTN, HLD, DM type2, cervical radiculopathy s/p cervical discectomy 2014, lumbar spinal stenosis s/p lumbar laminectomy 2014, DVT (deep venous thrombosis: left LE: soleal DVT- August 2014- On coumadin), CKD stage 3. Pt reports "prostate issues" for 1-2 yrs. Pt  had a prostate biopsy 12/2021 revealing nonspecific granulomatous prostatitis. Pt c/o urinary frequency, urgency, and nocturia. Pt denies dysuria or hematuria at this time. Pt evaluated by Dr. Agrawal for a scheduled Robotic assisted laparoscopic simple prostatectomy on 8/24/22. Pt denies recent travel, sick contact, or covid infection.     **Covid swab on 8/21 at [facility]

## 2022-08-10 NOTE — H&P PST ADULT - ASSESSMENT
PORSHAI VTE 2.0 SCORE [CLOT updated 2019]    AGE RELATED RISK FACTORS                                                       MOBILITY RELATED FACTORS  [ ] Age 41-60 years                                            (1 Point)                    [ ] Bed rest                                                        (1 Point)  [ ] Age: 61-74 years                                           (2 Points)                  [ ] Plaster cast                                                   (2 Points)  [ ] Age= 75 years                                              (3 Points)                    [ ] Bed bound for more than 72 hours                 (2 Points)    DISEASE RELATED RISK FACTORS                                               GENDER SPECIFIC FACTORS  [ ] Edema in the lower extremities                       (1 Point)              [ ] Pregnancy                                                     (1 Point)  [ ] Varicose veins                                               (1 Point)                     [ ] Post-partum < 6 weeks                                   (1 Point)             [ ] BMI > 25 Kg/m2                                            (1 Point)                     [ ] Hormonal therapy  or oral contraception          (1 Point)                 [ ] Sepsis (in the previous month)                        (1 Point)               [ ] History of pregnancy complications                 (1 point)  [ ] Pneumonia or serious lung disease                                               [ ] Unexplained or recurrent                     (1 Point)           (in the previous month)                               (1 Point)  [ ] Abnormal pulmonary function test                     (1 Point)                 SURGERY RELATED RISK FACTORS  [ ] Acute myocardial infarction                              (1 Point)               [ ]  Section                                             (1 Point)  [ ] Congestive heart failure (in the previous month)  (1 Point)      [ ] Minor surgery                                                  (1 Point)   [ ] Inflammatory bowel disease                             (1 Point)               [ ] Arthroscopic surgery                                        (2 Points)  [ ] Central venous access                                      (2 Points)                [ ] General surgery lasting more than 45 minutes (2 points)  [ ] Malignancy- Present or previous                   (2 Points)                [ ] Elective arthroplasty                                         (5 points)    [ ] Stroke (in the previous month)                          (5 Points)                                                                                                                                                           HEMATOLOGY RELATED FACTORS                                                 TRAUMA RELATED RISK FACTORS  [ ] Prior episodes of VTE                                     (3 Points)                [ ] Fracture of the hip, pelvis, or leg                       (5 Points)  [ ] Positive family history for VTE                         (3 Points)             [ ] Acute spinal cord injury (in the previous month)  (5 Points)  [ ] Prothrombin 39803 A                                     (3 Points)               [ ] Paralysis  (less than 1 month)                             (5 Points)  [ ] Factor V Leiden                                             (3 Points)                  [ ] Multiple Trauma within 1 month                        (5 Points)  [ ] Lupus anticoagulants                                     (3 Points)                                                           [ ] Anticardiolipin antibodies                               (3 Points)                                                       [ ] High homocysteine in the blood                      (3 Points)                                             [ ] Other congenital or acquired thrombophilia      (3 Points)                                                [ ] Heparin induced thrombocytopenia                  (3 Points)                                     Total Score [     4     ]

## 2022-08-10 NOTE — H&P PST ADULT - FALL HARM RISK - UNIVERSAL INTERVENTIONS
Bed in lowest position, wheels locked, appropriate side rails in place/Call bell, personal items and telephone in reach/Instruct patient to call for assistance before getting out of bed or chair/Non-slip footwear when patient is out of bed/[place] to call system/Physically safe environment - no spills, clutter or unnecessary equipment/Purposeful Proactive Rounding/Room/bathroom lighting operational, light cord in reach

## 2022-08-10 NOTE — H&P PST ADULT - ATTENDING COMMENTS
Patient seen and examined, plan for robotic assisted laparoscopic simple prostatectomy and all indicated procedures

## 2022-08-10 NOTE — H&P PST ADULT - PROBLEM SELECTOR PLAN 1
scheduled robotic assisted laparoscopic simple prostatectomy on 8/24/22  -preop instructions given  -labs: CBC, BMP, A1c, T&S, urine C&S done in PST  -DOS: ABO, FS  -Obtain MC from PCP  -obtain Cardiac clear. form Dr. Andrews

## 2022-08-11 LAB
CULTURE RESULTS: SIGNIFICANT CHANGE UP
SPECIMEN SOURCE: SIGNIFICANT CHANGE UP

## 2022-08-15 PROBLEM — N40.0 BENIGN PROSTATIC HYPERPLASIA WITHOUT LOWER URINARY TRACT SYMPTOMS: Chronic | Status: ACTIVE | Noted: 2022-08-10

## 2022-08-15 PROBLEM — N18.30 CHRONIC KIDNEY DISEASE, STAGE 3 UNSPECIFIED: Chronic | Status: ACTIVE | Noted: 2022-08-10

## 2022-08-21 ENCOUNTER — OUTPATIENT (OUTPATIENT)
Dept: OUTPATIENT SERVICES | Facility: HOSPITAL | Age: 72
LOS: 1 days | End: 2022-08-21

## 2022-08-21 DIAGNOSIS — Z95.5 PRESENCE OF CORONARY ANGIOPLASTY IMPLANT AND GRAFT: Chronic | ICD-10-CM

## 2022-08-21 DIAGNOSIS — Z98.890 OTHER SPECIFIED POSTPROCEDURAL STATES: Chronic | ICD-10-CM

## 2022-08-21 DIAGNOSIS — Z98.89 OTHER SPECIFIED POSTPROCEDURAL STATES: Chronic | ICD-10-CM

## 2022-08-21 DIAGNOSIS — Z11.52 ENCOUNTER FOR SCREENING FOR COVID-19: ICD-10-CM

## 2022-08-21 LAB — SARS-COV-2 RNA SPEC QL NAA+PROBE: SIGNIFICANT CHANGE UP

## 2022-08-23 ENCOUNTER — TRANSCRIPTION ENCOUNTER (OUTPATIENT)
Age: 72
End: 2022-08-23

## 2022-08-23 RX ORDER — CEFAZOLIN SODIUM 1 G
2000 VIAL (EA) INJECTION ONCE
Refills: 0 | Status: DISCONTINUED | OUTPATIENT
Start: 2022-08-24 | End: 2022-08-26

## 2022-08-24 ENCOUNTER — RESULT REVIEW (OUTPATIENT)
Age: 72
End: 2022-08-24

## 2022-08-24 ENCOUNTER — INPATIENT (INPATIENT)
Facility: HOSPITAL | Age: 72
LOS: 1 days | Discharge: HOME | DRG: 715 | End: 2022-08-26
Attending: UROLOGY | Admitting: UROLOGY
Payer: MEDICARE

## 2022-08-24 ENCOUNTER — APPOINTMENT (OUTPATIENT)
Age: 72
End: 2022-08-24

## 2022-08-24 VITALS
SYSTOLIC BLOOD PRESSURE: 164 MMHG | HEIGHT: 74 IN | DIASTOLIC BLOOD PRESSURE: 84 MMHG | RESPIRATION RATE: 16 BRPM | WEIGHT: 268.3 LBS | OXYGEN SATURATION: 97 % | HEART RATE: 77 BPM | TEMPERATURE: 98 F

## 2022-08-24 DIAGNOSIS — Z98.89 OTHER SPECIFIED POSTPROCEDURAL STATES: Chronic | ICD-10-CM

## 2022-08-24 DIAGNOSIS — N32.0 BLADDER-NECK OBSTRUCTION: ICD-10-CM

## 2022-08-24 DIAGNOSIS — Z98.890 OTHER SPECIFIED POSTPROCEDURAL STATES: Chronic | ICD-10-CM

## 2022-08-24 DIAGNOSIS — Z95.5 PRESENCE OF CORONARY ANGIOPLASTY IMPLANT AND GRAFT: Chronic | ICD-10-CM

## 2022-08-24 LAB
ANION GAP SERPL CALC-SCNC: 12 MMOL/L — SIGNIFICANT CHANGE UP (ref 5–17)
BASOPHILS # BLD AUTO: 0.02 K/UL — SIGNIFICANT CHANGE UP (ref 0–0.2)
BASOPHILS NFR BLD AUTO: 0.2 % — SIGNIFICANT CHANGE UP (ref 0–2)
BUN SERPL-MCNC: 34 MG/DL — HIGH (ref 7–23)
CALCIUM SERPL-MCNC: 8.8 MG/DL — SIGNIFICANT CHANGE UP (ref 8.4–10.5)
CHLORIDE SERPL-SCNC: 101 MMOL/L — SIGNIFICANT CHANGE UP (ref 96–108)
CO2 SERPL-SCNC: 27 MMOL/L — SIGNIFICANT CHANGE UP (ref 22–31)
CREAT SERPL-MCNC: 2.2 MG/DL — HIGH (ref 0.5–1.3)
EGFR: 31 ML/MIN/1.73M2 — LOW
EOSINOPHIL # BLD AUTO: 0.01 K/UL — SIGNIFICANT CHANGE UP (ref 0–0.5)
EOSINOPHIL NFR BLD AUTO: 0.1 % — SIGNIFICANT CHANGE UP (ref 0–6)
GLUCOSE BLDC GLUCOMTR-MCNC: 141 MG/DL — HIGH (ref 70–99)
GLUCOSE BLDC GLUCOMTR-MCNC: 146 MG/DL — HIGH (ref 70–99)
GLUCOSE BLDC GLUCOMTR-MCNC: 147 MG/DL — HIGH (ref 70–99)
GLUCOSE BLDC GLUCOMTR-MCNC: 98 MG/DL — SIGNIFICANT CHANGE UP (ref 70–99)
GLUCOSE SERPL-MCNC: 161 MG/DL — HIGH (ref 70–99)
HCT VFR BLD CALC: 38.7 % — LOW (ref 39–50)
HGB BLD-MCNC: 12.2 G/DL — LOW (ref 13–17)
IMM GRANULOCYTES NFR BLD AUTO: 0.7 % — SIGNIFICANT CHANGE UP (ref 0–1.5)
LYMPHOCYTES # BLD AUTO: 0.62 K/UL — LOW (ref 1–3.3)
LYMPHOCYTES # BLD AUTO: 5.9 % — LOW (ref 13–44)
MCHC RBC-ENTMCNC: 31 PG — SIGNIFICANT CHANGE UP (ref 27–34)
MCHC RBC-ENTMCNC: 31.5 GM/DL — LOW (ref 32–36)
MCV RBC AUTO: 98.2 FL — SIGNIFICANT CHANGE UP (ref 80–100)
MONOCYTES # BLD AUTO: 0.27 K/UL — SIGNIFICANT CHANGE UP (ref 0–0.9)
MONOCYTES NFR BLD AUTO: 2.6 % — SIGNIFICANT CHANGE UP (ref 2–14)
NEUTROPHILS # BLD AUTO: 9.59 K/UL — HIGH (ref 1.8–7.4)
NEUTROPHILS NFR BLD AUTO: 90.5 % — HIGH (ref 43–77)
NRBC # BLD: 0 /100 WBCS — SIGNIFICANT CHANGE UP (ref 0–0)
PLATELET # BLD AUTO: 181 K/UL — SIGNIFICANT CHANGE UP (ref 150–400)
POTASSIUM SERPL-MCNC: 4.2 MMOL/L — SIGNIFICANT CHANGE UP (ref 3.5–5.3)
POTASSIUM SERPL-SCNC: 4.2 MMOL/L — SIGNIFICANT CHANGE UP (ref 3.5–5.3)
RBC # BLD: 3.94 M/UL — LOW (ref 4.2–5.8)
RBC # FLD: 13.5 % — SIGNIFICANT CHANGE UP (ref 10.3–14.5)
SODIUM SERPL-SCNC: 140 MMOL/L — SIGNIFICANT CHANGE UP (ref 135–145)
WBC # BLD: 10.58 K/UL — HIGH (ref 3.8–10.5)
WBC # FLD AUTO: 10.58 K/UL — HIGH (ref 3.8–10.5)

## 2022-08-24 PROCEDURE — 88344 IMHCHEM/IMCYTCHM EA MLT ANTB: CPT | Mod: 26

## 2022-08-24 PROCEDURE — 55821 REMOVAL OF PROSTATE: CPT

## 2022-08-24 PROCEDURE — 88305 TISSUE EXAM BY PATHOLOGIST: CPT | Mod: 26

## 2022-08-24 RX ORDER — SENNA PLUS 8.6 MG/1
1 TABLET ORAL AT BEDTIME
Refills: 0 | Status: DISCONTINUED | OUTPATIENT
Start: 2022-08-24 | End: 2022-08-26

## 2022-08-24 RX ORDER — LIDOCAINE HCL 20 MG/ML
0.2 VIAL (ML) INJECTION ONCE
Refills: 0 | Status: DISCONTINUED | OUTPATIENT
Start: 2022-08-24 | End: 2022-08-24

## 2022-08-24 RX ORDER — DEXTROSE 50 % IN WATER 50 %
25 SYRINGE (ML) INTRAVENOUS ONCE
Refills: 0 | Status: DISCONTINUED | OUTPATIENT
Start: 2022-08-24 | End: 2022-08-26

## 2022-08-24 RX ORDER — AMLODIPINE BESYLATE 2.5 MG/1
5 TABLET ORAL DAILY
Refills: 0 | Status: DISCONTINUED | OUTPATIENT
Start: 2022-08-24 | End: 2022-08-26

## 2022-08-24 RX ORDER — DEXTROSE 50 % IN WATER 50 %
15 SYRINGE (ML) INTRAVENOUS ONCE
Refills: 0 | Status: DISCONTINUED | OUTPATIENT
Start: 2022-08-24 | End: 2022-08-26

## 2022-08-24 RX ORDER — SODIUM CHLORIDE 9 MG/ML
1000 INJECTION, SOLUTION INTRAVENOUS
Refills: 0 | Status: DISCONTINUED | OUTPATIENT
Start: 2022-08-24 | End: 2022-08-26

## 2022-08-24 RX ORDER — DEXTROSE 50 % IN WATER 50 %
12.5 SYRINGE (ML) INTRAVENOUS ONCE
Refills: 0 | Status: DISCONTINUED | OUTPATIENT
Start: 2022-08-24 | End: 2022-08-26

## 2022-08-24 RX ORDER — AMLODIPINE BESYLATE 2.5 MG/1
1 TABLET ORAL
Qty: 0 | Refills: 0 | DISCHARGE

## 2022-08-24 RX ORDER — ASPIRIN/CALCIUM CARB/MAGNESIUM 324 MG
1 TABLET ORAL
Qty: 0 | Refills: 0 | DISCHARGE

## 2022-08-24 RX ORDER — ATORVASTATIN CALCIUM 80 MG/1
1 TABLET, FILM COATED ORAL
Qty: 0 | Refills: 0 | DISCHARGE

## 2022-08-24 RX ORDER — OLMESARTAN MEDOXOMIL-HYDROCHLOROTHIAZIDE 25; 40 MG/1; MG/1
1 TABLET, FILM COATED ORAL
Qty: 0 | Refills: 0 | DISCHARGE

## 2022-08-24 RX ORDER — HYDRALAZINE HCL 50 MG
1 TABLET ORAL
Qty: 0 | Refills: 0 | DISCHARGE

## 2022-08-24 RX ORDER — LOSARTAN POTASSIUM 100 MG/1
100 TABLET, FILM COATED ORAL DAILY
Refills: 0 | Status: DISCONTINUED | OUTPATIENT
Start: 2022-08-24 | End: 2022-08-26

## 2022-08-24 RX ORDER — METFORMIN HYDROCHLORIDE 850 MG/1
1 TABLET ORAL
Qty: 0 | Refills: 0 | DISCHARGE

## 2022-08-24 RX ORDER — ATROPA BELLADONNA AND OPIUM 16.2; 6 MG/1; MG/1
1 SUPPOSITORY RECTAL ONCE
Refills: 0 | Status: DISCONTINUED | OUTPATIENT
Start: 2022-08-24 | End: 2022-08-24

## 2022-08-24 RX ORDER — ONDANSETRON 8 MG/1
4 TABLET, FILM COATED ORAL ONCE
Refills: 0 | Status: DISCONTINUED | OUTPATIENT
Start: 2022-08-24 | End: 2022-08-24

## 2022-08-24 RX ORDER — ASPIRIN/CALCIUM CARB/MAGNESIUM 324 MG
81 TABLET ORAL DAILY
Refills: 0 | Status: DISCONTINUED | OUTPATIENT
Start: 2022-08-24 | End: 2022-08-26

## 2022-08-24 RX ORDER — HYDROMORPHONE HYDROCHLORIDE 2 MG/ML
0.25 INJECTION INTRAMUSCULAR; INTRAVENOUS; SUBCUTANEOUS
Refills: 0 | Status: DISCONTINUED | OUTPATIENT
Start: 2022-08-24 | End: 2022-08-24

## 2022-08-24 RX ORDER — OXYCODONE HYDROCHLORIDE 5 MG/1
10 TABLET ORAL EVERY 6 HOURS
Refills: 0 | Status: DISCONTINUED | OUTPATIENT
Start: 2022-08-24 | End: 2022-08-26

## 2022-08-24 RX ORDER — HYDROCHLOROTHIAZIDE 25 MG
12.5 TABLET ORAL DAILY
Refills: 0 | Status: DISCONTINUED | OUTPATIENT
Start: 2022-08-24 | End: 2022-08-26

## 2022-08-24 RX ORDER — METOPROLOL TARTRATE 50 MG
25 TABLET ORAL
Refills: 0 | Status: DISCONTINUED | OUTPATIENT
Start: 2022-08-24 | End: 2022-08-25

## 2022-08-24 RX ORDER — INSULIN LISPRO 100/ML
VIAL (ML) SUBCUTANEOUS
Refills: 0 | Status: DISCONTINUED | OUTPATIENT
Start: 2022-08-24 | End: 2022-08-26

## 2022-08-24 RX ORDER — MULTIVIT-MIN/FERROUS GLUCONATE 9 MG/15 ML
1 LIQUID (ML) ORAL
Qty: 0 | Refills: 0 | DISCHARGE

## 2022-08-24 RX ORDER — GLUCAGON INJECTION, SOLUTION 0.5 MG/.1ML
1 INJECTION, SOLUTION SUBCUTANEOUS ONCE
Refills: 0 | Status: DISCONTINUED | OUTPATIENT
Start: 2022-08-24 | End: 2022-08-26

## 2022-08-24 RX ORDER — ACETAMINOPHEN 500 MG
975 TABLET ORAL EVERY 6 HOURS
Refills: 0 | Status: DISCONTINUED | OUTPATIENT
Start: 2022-08-24 | End: 2022-08-26

## 2022-08-24 RX ORDER — CEFAZOLIN SODIUM 1 G
2000 VIAL (EA) INJECTION EVERY 8 HOURS
Refills: 0 | Status: COMPLETED | OUTPATIENT
Start: 2022-08-24 | End: 2022-08-25

## 2022-08-24 RX ORDER — INSULIN LISPRO 100/ML
VIAL (ML) SUBCUTANEOUS AT BEDTIME
Refills: 0 | Status: DISCONTINUED | OUTPATIENT
Start: 2022-08-24 | End: 2022-08-26

## 2022-08-24 RX ORDER — MULTIVIT-MIN/FERROUS GLUCONATE 9 MG/15 ML
1 LIQUID (ML) ORAL DAILY
Refills: 0 | Status: DISCONTINUED | OUTPATIENT
Start: 2022-08-24 | End: 2022-08-26

## 2022-08-24 RX ORDER — OXYCODONE HYDROCHLORIDE 5 MG/1
5 TABLET ORAL EVERY 6 HOURS
Refills: 0 | Status: DISCONTINUED | OUTPATIENT
Start: 2022-08-24 | End: 2022-08-26

## 2022-08-24 RX ORDER — ATORVASTATIN CALCIUM 80 MG/1
10 TABLET, FILM COATED ORAL AT BEDTIME
Refills: 0 | Status: DISCONTINUED | OUTPATIENT
Start: 2022-08-24 | End: 2022-08-26

## 2022-08-24 RX ORDER — SODIUM CHLORIDE 9 MG/ML
1000 INJECTION INTRAMUSCULAR; INTRAVENOUS; SUBCUTANEOUS
Refills: 0 | Status: DISCONTINUED | OUTPATIENT
Start: 2022-08-24 | End: 2022-08-25

## 2022-08-24 RX ORDER — HYDRALAZINE HCL 50 MG
50 TABLET ORAL THREE TIMES A DAY
Refills: 0 | Status: DISCONTINUED | OUTPATIENT
Start: 2022-08-24 | End: 2022-08-26

## 2022-08-24 RX ORDER — SODIUM CHLORIDE 9 MG/ML
3 INJECTION INTRAMUSCULAR; INTRAVENOUS; SUBCUTANEOUS EVERY 8 HOURS
Refills: 0 | Status: DISCONTINUED | OUTPATIENT
Start: 2022-08-24 | End: 2022-08-24

## 2022-08-24 RX ORDER — HEPARIN SODIUM 5000 [USP'U]/ML
5000 INJECTION INTRAVENOUS; SUBCUTANEOUS EVERY 8 HOURS
Refills: 0 | Status: DISCONTINUED | OUTPATIENT
Start: 2022-08-24 | End: 2022-08-26

## 2022-08-24 RX ADMIN — SODIUM CHLORIDE 3 MILLILITER(S): 9 INJECTION INTRAMUSCULAR; INTRAVENOUS; SUBCUTANEOUS at 14:00

## 2022-08-24 RX ADMIN — Medication 50 MILLIGRAM(S): at 21:13

## 2022-08-24 RX ADMIN — ATROPA BELLADONNA AND OPIUM 1 SUPPOSITORY(S): 16.2; 6 SUPPOSITORY RECTAL at 15:00

## 2022-08-24 RX ADMIN — HEPARIN SODIUM 5000 UNIT(S): 5000 INJECTION INTRAVENOUS; SUBCUTANEOUS at 16:58

## 2022-08-24 RX ADMIN — SODIUM CHLORIDE 125 MILLILITER(S): 9 INJECTION INTRAMUSCULAR; INTRAVENOUS; SUBCUTANEOUS at 21:13

## 2022-08-24 RX ADMIN — Medication 100 MILLIGRAM(S): at 16:57

## 2022-08-24 RX ADMIN — SODIUM CHLORIDE 3 MILLILITER(S): 9 INJECTION INTRAMUSCULAR; INTRAVENOUS; SUBCUTANEOUS at 22:15

## 2022-08-24 RX ADMIN — SENNA PLUS 1 TABLET(S): 8.6 TABLET ORAL at 21:13

## 2022-08-24 RX ADMIN — Medication 100 MILLIGRAM(S): at 23:20

## 2022-08-24 RX ADMIN — HEPARIN SODIUM 5000 UNIT(S): 5000 INJECTION INTRAVENOUS; SUBCUTANEOUS at 23:20

## 2022-08-24 RX ADMIN — ATROPA BELLADONNA AND OPIUM 1 SUPPOSITORY(S): 16.2; 6 SUPPOSITORY RECTAL at 15:30

## 2022-08-24 RX ADMIN — Medication 975 MILLIGRAM(S): at 18:56

## 2022-08-24 RX ADMIN — SODIUM CHLORIDE 125 MILLILITER(S): 9 INJECTION INTRAMUSCULAR; INTRAVENOUS; SUBCUTANEOUS at 12:54

## 2022-08-24 RX ADMIN — Medication 25 MILLIGRAM(S): at 18:56

## 2022-08-24 RX ADMIN — ATORVASTATIN CALCIUM 10 MILLIGRAM(S): 80 TABLET, FILM COATED ORAL at 21:13

## 2022-08-24 NOTE — PROGRESS NOTE ADULT - SUBJECTIVE AND OBJECTIVE BOX
Post op Check    Pt seen and examined without complaints. Pain is controlled. Denies SOB/CP/N/V.     Vital Signs Last 24 Hrs  T(C): 36 (24 Aug 2022 12:30), Max: 36.9 (24 Aug 2022 06:42)  T(F): 96.8 (24 Aug 2022 12:30), Max: 98.4 (24 Aug 2022 06:42)  HR: 59 (24 Aug 2022 15:00) (58 - 77)  BP: 120/67 (24 Aug 2022 15:00) (97/69 - 164/84)  BP(mean): 88 (24 Aug 2022 15:00) (78 - 93)  RR: 16 (24 Aug 2022 15:00) (16 - 16)  SpO2: 96% (24 Aug 2022 15:00) (95% - 98%)    Parameters below as of 24 Aug 2022 14:45  Patient On (Oxygen Delivery Method): room air        I&O's Summary    24 Aug 2022 07:01  -  24 Aug 2022 15:26  --------------------------------------------------------  IN: 250 mL / OUT: 0 mL / NET: 250 mL        Physical Exam  Gen: NAD  Pulm: No respiratory distress, no subcostal retractions  CV: RRR, no JVD  Abd: Soft, NT, ND, incisions CDI, NGUYEN serosanguinous  :  CBI clear                           12.2   10.58 )-----------( 181      ( 24 Aug 2022 12:57 )             38.7       08-24    140  |  101  |  34<H>  ----------------------------<  161<H>  4.2   |  27  |  2.20<H>    Ca    8.8      24 Aug 2022 12:57        A/P: 71y Male s/p Robotic SPP  CBI  Keep ball in   Abx   DVT prophylaxis/OOB  Incentive spirometry  Analgesia and antiemetics as needed  Diet clears until flatus  AM labs  AM NGUYEN creatinine

## 2022-08-24 NOTE — CHART NOTE - NSCHARTNOTEFT_GEN_A_CORE
Called to bedside @1745to evaluate patient for numbness in fingers. Patient with both Cervical fusion in 2014 and lumbar fusion. Patient is a poor historian. States that numbness in left tip of middle, ring and pinkie fingers. And tips of all fingers on right hand. Initially patient denied any health issues with his neck. Patient was positioned supine with arms secured at sides. Patient states improving now @ 1810. D/W Urology and will discharge to floor and then reevaluate.

## 2022-08-24 NOTE — PATIENT PROFILE ADULT - FALL HARM RISK - RISK INTERVENTIONS
Assistance OOB with selected safe patient handling equipment/Assistance with ambulation/Communicate Fall Risk and Risk Factors to all staff, patient, and family/Monitor gait and stability/Reinforce activity limits and safety measures with patient and family/Sit up slowly, dangle for a short time, stand at bedside before walking/Use of alarms - bed, chair and/or voice tab/Visual Cue: Yellow wristband/Bed in lowest position, wheels locked, appropriate side rails in place/Call bell, personal items and telephone in reach/Instruct patient to call for assistance before getting out of bed or chair/Non-slip footwear when patient is out of bed/[place] to call system/Physically safe environment - no spills, clutter or unnecessary equipment/Purposeful Proactive Rounding/Room/bathroom lighting operational, light cord in reach

## 2022-08-24 NOTE — PRE-ANESTHESIA EVALUATION ADULT - NSANTHADDINFOFT_GEN_ALL_CORE
Medical Clearance by Dr. AYDEN Feliz and Cardiology clearance by Dr. HIEN Andrews read and appreciated.

## 2022-08-25 ENCOUNTER — TRANSCRIPTION ENCOUNTER (OUTPATIENT)
Age: 72
End: 2022-08-25

## 2022-08-25 LAB
ANION GAP SERPL CALC-SCNC: 11 MMOL/L — SIGNIFICANT CHANGE UP (ref 5–17)
BASOPHILS # BLD AUTO: 0.03 K/UL — SIGNIFICANT CHANGE UP (ref 0–0.2)
BASOPHILS NFR BLD AUTO: 0.3 % — SIGNIFICANT CHANGE UP (ref 0–2)
BUN SERPL-MCNC: 36 MG/DL — HIGH (ref 7–23)
CALCIUM SERPL-MCNC: 8.5 MG/DL — SIGNIFICANT CHANGE UP (ref 8.4–10.5)
CHLORIDE SERPL-SCNC: 100 MMOL/L — SIGNIFICANT CHANGE UP (ref 96–108)
CO2 SERPL-SCNC: 28 MMOL/L — SIGNIFICANT CHANGE UP (ref 22–31)
CREAT SERPL-MCNC: 2.28 MG/DL — HIGH (ref 0.5–1.3)
EGFR: 30 ML/MIN/1.73M2 — LOW
EOSINOPHIL # BLD AUTO: 0.05 K/UL — SIGNIFICANT CHANGE UP (ref 0–0.5)
EOSINOPHIL NFR BLD AUTO: 0.5 % — SIGNIFICANT CHANGE UP (ref 0–6)
GLUCOSE BLDC GLUCOMTR-MCNC: 104 MG/DL — HIGH (ref 70–99)
GLUCOSE BLDC GLUCOMTR-MCNC: 108 MG/DL — HIGH (ref 70–99)
GLUCOSE BLDC GLUCOMTR-MCNC: 119 MG/DL — HIGH (ref 70–99)
GLUCOSE BLDC GLUCOMTR-MCNC: 148 MG/DL — HIGH (ref 70–99)
GLUCOSE SERPL-MCNC: 107 MG/DL — HIGH (ref 70–99)
HCT VFR BLD CALC: 36.8 % — LOW (ref 39–50)
HGB BLD-MCNC: 11.3 G/DL — LOW (ref 13–17)
IMM GRANULOCYTES NFR BLD AUTO: 0.5 % — SIGNIFICANT CHANGE UP (ref 0–1.5)
LYMPHOCYTES # BLD AUTO: 1.06 K/UL — SIGNIFICANT CHANGE UP (ref 1–3.3)
LYMPHOCYTES # BLD AUTO: 10.5 % — LOW (ref 13–44)
MCHC RBC-ENTMCNC: 30.7 GM/DL — LOW (ref 32–36)
MCHC RBC-ENTMCNC: 30.7 PG — SIGNIFICANT CHANGE UP (ref 27–34)
MCV RBC AUTO: 100 FL — SIGNIFICANT CHANGE UP (ref 80–100)
MONOCYTES # BLD AUTO: 0.82 K/UL — SIGNIFICANT CHANGE UP (ref 0–0.9)
MONOCYTES NFR BLD AUTO: 8.1 % — SIGNIFICANT CHANGE UP (ref 2–14)
NEUTROPHILS # BLD AUTO: 8.13 K/UL — HIGH (ref 1.8–7.4)
NEUTROPHILS NFR BLD AUTO: 80.1 % — HIGH (ref 43–77)
NRBC # BLD: 0 /100 WBCS — SIGNIFICANT CHANGE UP (ref 0–0)
PLATELET # BLD AUTO: 188 K/UL — SIGNIFICANT CHANGE UP (ref 150–400)
POTASSIUM SERPL-MCNC: 4.5 MMOL/L — SIGNIFICANT CHANGE UP (ref 3.5–5.3)
POTASSIUM SERPL-SCNC: 4.5 MMOL/L — SIGNIFICANT CHANGE UP (ref 3.5–5.3)
RBC # BLD: 3.68 M/UL — LOW (ref 4.2–5.8)
RBC # FLD: 13.7 % — SIGNIFICANT CHANGE UP (ref 10.3–14.5)
SODIUM SERPL-SCNC: 139 MMOL/L — SIGNIFICANT CHANGE UP (ref 135–145)
WBC # BLD: 10.14 K/UL — SIGNIFICANT CHANGE UP (ref 3.8–10.5)
WBC # FLD AUTO: 10.14 K/UL — SIGNIFICANT CHANGE UP (ref 3.8–10.5)

## 2022-08-25 PROCEDURE — 93010 ELECTROCARDIOGRAM REPORT: CPT

## 2022-08-25 RX ORDER — ACETAMINOPHEN 500 MG
3 TABLET ORAL
Qty: 0 | Refills: 0 | DISCHARGE
Start: 2022-08-25

## 2022-08-25 RX ORDER — LIDOCAINE 4 G/100G
1 CREAM TOPICAL ONCE
Refills: 0 | Status: DISCONTINUED | OUTPATIENT
Start: 2022-08-25 | End: 2022-08-26

## 2022-08-25 RX ORDER — OXYCODONE HYDROCHLORIDE 5 MG/1
1 TABLET ORAL
Qty: 12 | Refills: 0
Start: 2022-08-25 | End: 2022-08-27

## 2022-08-25 RX ORDER — METOPROLOL TARTRATE 50 MG
1 TABLET ORAL
Qty: 0 | Refills: 0 | DISCHARGE

## 2022-08-25 RX ORDER — ATROPA BELLADONNA AND OPIUM 16.2; 6 MG/1; MG/1
1 SUPPOSITORY RECTAL EVERY 8 HOURS
Refills: 0 | Status: DISCONTINUED | OUTPATIENT
Start: 2022-08-25 | End: 2022-08-26

## 2022-08-25 RX ORDER — SODIUM CHLORIDE 9 MG/ML
1000 INJECTION, SOLUTION INTRAVENOUS
Refills: 0 | Status: DISCONTINUED | OUTPATIENT
Start: 2022-08-25 | End: 2022-08-26

## 2022-08-25 RX ADMIN — CHLORHEXIDINE GLUCONATE 1 APPLICATION(S): 213 SOLUTION TOPICAL at 11:45

## 2022-08-25 RX ADMIN — HEPARIN SODIUM 5000 UNIT(S): 5000 INJECTION INTRAVENOUS; SUBCUTANEOUS at 16:11

## 2022-08-25 RX ADMIN — SODIUM CHLORIDE 3 MILLILITER(S): 9 INJECTION INTRAMUSCULAR; INTRAVENOUS; SUBCUTANEOUS at 13:35

## 2022-08-25 RX ADMIN — SODIUM CHLORIDE 75 MILLILITER(S): 9 INJECTION, SOLUTION INTRAVENOUS at 07:41

## 2022-08-25 RX ADMIN — Medication 81 MILLIGRAM(S): at 11:45

## 2022-08-25 RX ADMIN — Medication 100 MILLIGRAM(S): at 07:47

## 2022-08-25 RX ADMIN — HEPARIN SODIUM 5000 UNIT(S): 5000 INJECTION INTRAVENOUS; SUBCUTANEOUS at 07:47

## 2022-08-25 RX ADMIN — AMLODIPINE BESYLATE 5 MILLIGRAM(S): 2.5 TABLET ORAL at 10:02

## 2022-08-25 RX ADMIN — Medication 1 TABLET(S): at 11:45

## 2022-08-25 RX ADMIN — ATORVASTATIN CALCIUM 10 MILLIGRAM(S): 80 TABLET, FILM COATED ORAL at 22:24

## 2022-08-25 RX ADMIN — Medication 50 MILLIGRAM(S): at 22:24

## 2022-08-25 RX ADMIN — SODIUM CHLORIDE 3 MILLILITER(S): 9 INJECTION INTRAMUSCULAR; INTRAVENOUS; SUBCUTANEOUS at 05:18

## 2022-08-25 NOTE — CONSULT NOTE ADULT - ASSESSMENT
Sinus bradycardia with sinus arrhythmia   has frequent PACs makes overall HR on low side given compensatory pauses   asymptomatic   appears to be chronic  suspect underlying VERONICA   recommend outpt sleep study   can hold metoprolol     HTN  stable  cont current meds  given his CKD and mild SHANTHI, consider holding hctz     DVT proph  on hep sq

## 2022-08-25 NOTE — PHYSICAL THERAPY INITIAL EVALUATION ADULT - ONSET DATE, REHAB EVAL
Subjective   Patient is a 46-year-old female who presents with concerns for persistent dizziness and vomiting for the past 2 days.  The patient states she feels better when she lies down but when she sits up she feels very unsteady as if she was on a ship.  Patient is concerned because she believes that she vomited up her morning blood pressure medications and she does not want to have a stroke from having high blood pressure.  Patient has fever, chills, chest pain, shortness of breath, abdominal pain or diarrhea.            Review of Systems   All other systems reviewed and are negative.      Past Medical History:   Diagnosis Date   • Allergic    • Hypertension    • Kidney stone    • Onychomycosis of toenail        Allergies   Allergen Reactions   • Aspirin    • Penicillins        Past Surgical History:   Procedure Laterality Date   • WISDOM TOOTH EXTRACTION         Family History   Problem Relation Age of Onset   • Hypertension Mother    • Heart disease Mother         CHF   • Diabetes Mother    • Hypertension Maternal Aunt    • Diabetes Maternal Uncle    • Hyperlipidemia Maternal Uncle    • Kidney disease Maternal Uncle        Social History     Socioeconomic History   • Marital status:      Spouse name: Not on file   • Number of children: Not on file   • Years of education: Not on file   • Highest education level: Not on file   Tobacco Use   • Smoking status: Never Smoker   • Smokeless tobacco: Never Used   Substance and Sexual Activity   • Alcohol use: Yes     Alcohol/week: 0.6 oz     Types: 1 Glasses of wine per week   • Drug use: No   • Sexual activity: Yes     Partners: Male           Objective   Physical Exam   Nursing note and vitals reviewed.    GEN: Morbidly obese, tearful and actively retching during exam  Head: Normocephalic, atraumatic  Eyes: Pupils equal round reactive to light  ENT: Posterior pharynx normal in appearance, oral mucosa is moist  Chest: Nontender to palpation  Cardiovascular:  Tachycardic in the low 100s   Lungs: Clear to auscultation bilaterally  Abdomen: Soft, nontender, nondistended, no peritoneal signs  Extremities: No edema, normal appearance  Neuro: GCS 15  Psych: Mood and affect are appropriate      Procedures           ED Course                  MDM  Number of Diagnoses or Management Options  Diagnosis management comments: Treated with IV fluids and IV antiemetics  Differential diagnosis would include viral versus bacterial gastroenteritis, food poisoning, peripheral vertigo, or other concerns       Amount and/or Complexity of Data Reviewed  Clinical lab tests: ordered and reviewed  Decide to obtain previous medical records or to obtain history from someone other than the patient: yes  Obtain history from someone other than the patient: yes  Review and summarize past medical records: yes          Final diagnoses:   Nausea and vomiting, intractability of vomiting not specified, unspecified vomiting type   Dizziness            Nadir Christianson MD  11/19/18 5319     24-Aug-2022

## 2022-08-25 NOTE — DISCHARGE NOTE PROVIDER - HOSPITAL COURSE
Underwent a robotic SPP on 8/24.  Post op he had CBI, which remained clear.  POD1 CBI was stopped.  He ambulated and tolerated reg diet.  NGUYEN drain removed before discharge. At the time of discharge, the patient was hemodynamically stable, was tolerating PO diet, was voiding urine and passing stool, was ambulating, and was comfortable with adequate pain control.

## 2022-08-25 NOTE — PROVIDER CONTACT NOTE (MEDICATION) - ASSESSMENT
A&Ox4,vss, pt denies chest pain, palpations, SOB, or any pain in general. Pt educated on the importance of b/p but continues to refuse

## 2022-08-25 NOTE — DISCHARGE NOTE PROVIDER - CARE PROVIDER_API CALL
Jj Agrawal)  Urology  [facility] - Dept of Urology, [address] [place] [address]  Phone: [phone]  Fax: [phone]  Follow Up Time:

## 2022-08-25 NOTE — DISCHARGE NOTE PROVIDER - NSDCCPCAREPLAN_GEN_ALL_CORE_FT
PRINCIPAL DISCHARGE DIAGNOSIS  Diagnosis: BPH with urinary obstruction  Assessment and Plan of Treatment: Please follow up with Dr. Agrawal in 10 days.  Call [phone] to schedule/confirm your appointment.  Call or follow up sooner with fevers, chills, nausea, vomiting, increasing pain, or with other concerns.  PLEASE START ANTIBIOTIC ON THE DAY BEFORE YOUR APPOINTMENT          PRINCIPAL DISCHARGE DIAGNOSIS  Diagnosis: BPH with urinary obstruction  Assessment and Plan of Treatment: Please follow up with Dr. Agrawal in 10 days.  Call [phone] to schedule/confirm your appointment.  Call or follow up sooner with fevers, chills, nausea, vomiting, increasing pain, or with other concerns.  PLEASE START ANTIBIOTIC ON THE DAY BEFORE YOUR APPOINTMENT         SECONDARY DISCHARGE DIAGNOSES  Diagnosis: HTN (hypertension)  Assessment and Plan of Treatment: PLEASE DO NOT TAKE METOPROLOL UNTIL INSTRUCTED BY YOUR CARDIOLOGIST DR. FERGUSON

## 2022-08-25 NOTE — PROGRESS NOTE ADULT - SUBJECTIVE AND OBJECTIVE BOX
Subjective  bradycardic overnight ranged 45-50;  has been oob + flatus, tolerating clears no nausea or vomiting  Objective    Vital signs  T(F): , Max: 98.2 (08-25-22 @ 05:05)  HR: 50 (08-25-22 @ 05:05)  BP: 119/70 (08-25-22 @ 05:05)  SpO2: 97% (08-25-22 @ 05:05)  Wt(kg): --    Output     08-24 @ 07:01  -  08-25 @ 06:47  --------------------------------------------------------  IN: 2695 mL / OUT: 58 mL / NET: 2637 mL        Gen awake alert nad axox3  Abd soft ntnd   incision c/d/i   renata scant serosang  ball in place off cbi clear       Labs      08-24 @ 12:57    WBC 10.58 / Hct 38.7  / SCr 2.20

## 2022-08-25 NOTE — DISCHARGE NOTE PROVIDER - NSDCMRMEDTOKEN_GEN_ALL_CORE_FT
acetaminophen 325 mg oral tablet: 3 tab(s) orally every 6 hours  amLODIPine 5 mg oral tablet: 1 tab(s) orally once a day  amoxicillin-clavulanate 875 mg-125 mg oral tablet: 1 tab(s) orally every 12 hours   START DAY BEFORE FOLLOW UP APPOINTMENT   Aspirin Enteric Coated 81 mg oral delayed release tablet: 1 tab(s) orally once a day  atorvastatin 10 mg oral tablet: 1 tab(s) orally once a day  Centrum Silver Men&#x27;s: 1 tab(s) orally once a day  hydrALAZINE 50 mg oral tablet: 1 tab(s) orally 3 times a day MDD:3  hydrALAZINE 50 mg oral tablet: 1 tab(s) orally 3 times a day  metFORMIN 1000 mg oral tablet: 1 tab(s) orally 1 to 2 times a day  olmesartan-hydrochlorothiazide 40 mg-12.5 mg oral tablet: 1 tab(s) orally once a day  oxyCODONE 5 mg oral tablet: 1 tab(s) orally every 6 hours, As needed, Moderate Pain (4 - 6) MDD:4

## 2022-08-25 NOTE — CONSULT NOTE ADULT - SUBJECTIVE AND OBJECTIVE BOX
CHIEF COMPLAINT:Patient is a 71y old  Male who presents with a chief complaint of prostate surgery (10 Aug 2022 10:32)      HISTORY OF PRESENT ILLNESS:    71 male with history as below s/p proctectomy   cardiology is called for bradycardia   pt denies any chest pain, sob, palpitation, dizziness or syncope.     PAST MEDICAL & SURGICAL HISTORY:  Diabetes  not on meds at this time due to recent 50 lb weight loss      Dyslipidemia      HTN (Hypertension), Benign      CAD (coronary artery disease)      DVT (deep venous thrombosis)  left soleal DVT- August 2014- On coumadin      Cervical disc disease with myelopathy      Lumbar disc disease with radiculopathy      Osteoarthritis      BPH (benign prostatic hyperplasia)      Stage 3 chronic kidney disease      Stented coronary artery  2 Drug elluding stents      S/P lumbar laminectomy      S/P knee surgery  right &amp; left      S/P cervical discectomy  2014      H/O prostate biopsy  12/2021      S/P colonoscopy              MEDICATIONS:  amLODIPine   Tablet 5 milliGRAM(s) Oral daily  aspirin enteric coated 81 milliGRAM(s) Oral daily  heparin   Injectable 5000 Unit(s) SubCutaneous every 8 hours  hydrALAZINE 50 milliGRAM(s) Oral three times a day  hydrochlorothiazide 12.5 milliGRAM(s) Oral daily  losartan 100 milliGRAM(s) Oral daily  metoprolol tartrate 25 milliGRAM(s) Oral two times a day    ceFAZolin   IVPB 2000 milliGRAM(s) IV Intermittent once  ceFAZolin   IVPB 2000 milliGRAM(s) IV Intermittent once      acetaminophen     Tablet .. 975 milliGRAM(s) Oral every 6 hours  belladonna 16.2 mG/opium 30 mg Suppository 1 Suppository(s) Rectal every 8 hours  oxyCODONE    IR 5 milliGRAM(s) Oral every 6 hours PRN  oxyCODONE    IR 10 milliGRAM(s) Oral every 6 hours PRN    senna 1 Tablet(s) Oral at bedtime    atorvastatin 10 milliGRAM(s) Oral at bedtime  dextrose 50% Injectable 25 Gram(s) IV Push once  dextrose 50% Injectable 12.5 Gram(s) IV Push once  dextrose 50% Injectable 25 Gram(s) IV Push once  dextrose Oral Gel 15 Gram(s) Oral once PRN  glucagon  Injectable 1 milliGRAM(s) IntraMuscular once  insulin lispro (ADMELOG) corrective regimen sliding scale   SubCutaneous three times a day before meals  insulin lispro (ADMELOG) corrective regimen sliding scale   SubCutaneous at bedtime    chlorhexidine 2% Cloths 1 Application(s) Topical daily  dextrose 5%. 1000 milliLiter(s) IV Continuous <Continuous>  dextrose 5%. 1000 milliLiter(s) IV Continuous <Continuous>  multivitamin/minerals 1 Tablet(s) Oral daily  sodium chloride 0.45%. 1000 milliLiter(s) IV Continuous <Continuous>  sodium chloride 0.9% lock flush 3 milliLiter(s) IV Push every 8 hours      FAMILY HISTORY:      Non-contributory    SOCIAL HISTORY:    No tobacco, drugs or etoh    Allergies    No Known Allergies    Intolerances    	    REVIEW OF SYSTEMS:  as above  The rest of the 14 points ROS reviewed and except above they are unremarkable.        PHYSICAL EXAM:  T(C): 36.8 (08-25-22 @ 05:05), Max: 36.9 (08-24-22 @ 08:10)  HR: 50 (08-25-22 @ 05:05) (50 - 77)  BP: 119/70 (08-25-22 @ 05:05) (97/69 - 164/84)  RR: 18 (08-25-22 @ 05:05) (16 - 18)  SpO2: 97% (08-25-22 @ 05:05) (94% - 98%)  Wt(kg): --  I&O's Summary    24 Aug 2022 07:01  -  25 Aug 2022 07:00  --------------------------------------------------------  IN: 2695 mL / OUT: 58 mL / NET: 2637 mL        JVP: Normal  Neck: supple  Lung: clear   CV: S1 S2 , Murmur:  Abd: soft  Ext: No edema  neuro: Awake / alert  Psych: flat affect  Skin: normal    LABS/DATA:    TELEMETRY: 	    ECG:  	   	  CARDIAC MARKERS:                                      12.2   10.58 )-----------( 181      ( 24 Aug 2022 12:57 )             38.7     08-24    140  |  101  |  34<H>  ----------------------------<  161<H>  4.2   |  27  |  2.20<H>    Ca    8.8      24 Aug 2022 12:57      proBNP:   Lipid Profile:   HgA1c:   TSH:

## 2022-08-25 NOTE — PHYSICAL THERAPY INITIAL EVALUATION ADULT - ADDITIONAL COMMENTS
Pt lives with spouse in a private home, 13 steps to negotiate. Pt is (I) with ADLs and functional mobility with no assistive device.

## 2022-08-25 NOTE — PHYSICAL THERAPY INITIAL EVALUATION ADULT - PERTINENT HX OF CURRENT PROBLEM, REHAB EVAL
Pt is a 72 y/o male with PMH of  CAD s/p stents x2 (on ASA) 2010, HTN, HLD, DM type2, cervical radiculopathy s/p cervical discectomy 2014, lumbar spinal stenosis s/p lumbar laminectomy 2014, DVT (deep venous thrombosis: left LE: soleal DVT- August 2014- On coumadin), CKD stage 3. Pt reports "prostate issues" for 1-2 yrs. Pt  had a prostate biopsy 12/2021 revealing nonspecific granulomatous prostatitis. Pt c/o urinary frequency, urgency, and nocturia. Pt s/p Robotic SPP 8/24/22

## 2022-08-25 NOTE — PROVIDER CONTACT NOTE (MEDICATION) - ACTION/TREATMENT ORDERED:
Angelina SAXENA made aware, pt was educated on importance of b/p but continues to refuse, continue to monitor

## 2022-08-26 ENCOUNTER — TRANSCRIPTION ENCOUNTER (OUTPATIENT)
Age: 72
End: 2022-08-26

## 2022-08-26 VITALS
OXYGEN SATURATION: 94 % | RESPIRATION RATE: 18 BRPM | SYSTOLIC BLOOD PRESSURE: 151 MMHG | HEART RATE: 100 BPM | DIASTOLIC BLOOD PRESSURE: 83 MMHG | TEMPERATURE: 98 F

## 2022-08-26 LAB
ANION GAP SERPL CALC-SCNC: 11 MMOL/L — SIGNIFICANT CHANGE UP (ref 5–17)
BUN SERPL-MCNC: 34 MG/DL — HIGH (ref 7–23)
CALCIUM SERPL-MCNC: 8 MG/DL — LOW (ref 8.4–10.5)
CHLORIDE SERPL-SCNC: 99 MMOL/L — SIGNIFICANT CHANGE UP (ref 96–108)
CO2 SERPL-SCNC: 27 MMOL/L — SIGNIFICANT CHANGE UP (ref 22–31)
CREAT FLD-MCNC: 2.44 MG/DL — SIGNIFICANT CHANGE UP
CREAT SERPL-MCNC: 2.44 MG/DL — HIGH (ref 0.5–1.3)
EGFR: 28 ML/MIN/1.73M2 — LOW
GLUCOSE BLDC GLUCOMTR-MCNC: 117 MG/DL — HIGH (ref 70–99)
GLUCOSE BLDC GLUCOMTR-MCNC: 173 MG/DL — HIGH (ref 70–99)
GLUCOSE SERPL-MCNC: 134 MG/DL — HIGH (ref 70–99)
HCT VFR BLD CALC: 35.7 % — LOW (ref 39–50)
HGB BLD-MCNC: 10.9 G/DL — LOW (ref 13–17)
MCHC RBC-ENTMCNC: 30.5 GM/DL — LOW (ref 32–36)
MCHC RBC-ENTMCNC: 30.7 PG — SIGNIFICANT CHANGE UP (ref 27–34)
MCV RBC AUTO: 100.6 FL — HIGH (ref 80–100)
NRBC # BLD: 0 /100 WBCS — SIGNIFICANT CHANGE UP (ref 0–0)
PLATELET # BLD AUTO: 172 K/UL — SIGNIFICANT CHANGE UP (ref 150–400)
POTASSIUM SERPL-MCNC: 4.2 MMOL/L — SIGNIFICANT CHANGE UP (ref 3.5–5.3)
POTASSIUM SERPL-SCNC: 4.2 MMOL/L — SIGNIFICANT CHANGE UP (ref 3.5–5.3)
RBC # BLD: 3.55 M/UL — LOW (ref 4.2–5.8)
RBC # FLD: 13.8 % — SIGNIFICANT CHANGE UP (ref 10.3–14.5)
SODIUM SERPL-SCNC: 137 MMOL/L — SIGNIFICANT CHANGE UP (ref 135–145)
WBC # BLD: 10.67 K/UL — HIGH (ref 3.8–10.5)
WBC # FLD AUTO: 10.67 K/UL — HIGH (ref 3.8–10.5)

## 2022-08-26 PROCEDURE — 88344 IMHCHEM/IMCYTCHM EA MLT ANTB: CPT

## 2022-08-26 PROCEDURE — 86850 RBC ANTIBODY SCREEN: CPT

## 2022-08-26 PROCEDURE — 93005 ELECTROCARDIOGRAM TRACING: CPT

## 2022-08-26 PROCEDURE — U0003: CPT

## 2022-08-26 PROCEDURE — 80048 BASIC METABOLIC PNL TOTAL CA: CPT

## 2022-08-26 PROCEDURE — C9803: CPT

## 2022-08-26 PROCEDURE — 82962 GLUCOSE BLOOD TEST: CPT

## 2022-08-26 PROCEDURE — 86901 BLOOD TYPING SEROLOGIC RH(D): CPT

## 2022-08-26 PROCEDURE — 36415 COLL VENOUS BLD VENIPUNCTURE: CPT

## 2022-08-26 PROCEDURE — 97161 PT EVAL LOW COMPLEX 20 MIN: CPT

## 2022-08-26 PROCEDURE — 86900 BLOOD TYPING SEROLOGIC ABO: CPT

## 2022-08-26 PROCEDURE — S2900: CPT

## 2022-08-26 PROCEDURE — U0005: CPT

## 2022-08-26 PROCEDURE — 85025 COMPLETE CBC W/AUTO DIFF WBC: CPT

## 2022-08-26 PROCEDURE — 82570 ASSAY OF URINE CREATININE: CPT

## 2022-08-26 PROCEDURE — 85027 COMPLETE CBC AUTOMATED: CPT

## 2022-08-26 PROCEDURE — C9399: CPT

## 2022-08-26 PROCEDURE — 88305 TISSUE EXAM BY PATHOLOGIST: CPT

## 2022-08-26 RX ADMIN — SODIUM CHLORIDE 3 MILLILITER(S): 9 INJECTION INTRAMUSCULAR; INTRAVENOUS; SUBCUTANEOUS at 13:20

## 2022-08-26 RX ADMIN — Medication 12.5 MILLIGRAM(S): at 05:41

## 2022-08-26 RX ADMIN — HEPARIN SODIUM 5000 UNIT(S): 5000 INJECTION INTRAVENOUS; SUBCUTANEOUS at 09:26

## 2022-08-26 RX ADMIN — HEPARIN SODIUM 5000 UNIT(S): 5000 INJECTION INTRAVENOUS; SUBCUTANEOUS at 01:15

## 2022-08-26 RX ADMIN — CHLORHEXIDINE GLUCONATE 1 APPLICATION(S): 213 SOLUTION TOPICAL at 11:46

## 2022-08-26 RX ADMIN — Medication 1: at 13:24

## 2022-08-26 RX ADMIN — Medication 1 TABLET(S): at 11:45

## 2022-08-26 RX ADMIN — HEPARIN SODIUM 5000 UNIT(S): 5000 INJECTION INTRAVENOUS; SUBCUTANEOUS at 15:43

## 2022-08-26 RX ADMIN — Medication 81 MILLIGRAM(S): at 11:45

## 2022-08-26 NOTE — PROGRESS NOTE ADULT - SUBJECTIVE AND OBJECTIVE BOX
DATE OF SERVICE: 08-26-22 @ 09:01    Subjective: Patient seen and examined. No new events except as noted.     SUBJECTIVE/ROS:  feels ok         MEDICATIONS:  MEDICATIONS  (STANDING):  acetaminophen     Tablet .. 975 milliGRAM(s) Oral every 6 hours  amLODIPine   Tablet 5 milliGRAM(s) Oral daily  aspirin enteric coated 81 milliGRAM(s) Oral daily  atorvastatin 10 milliGRAM(s) Oral at bedtime  belladonna 16.2 mG/opium 30 mg Suppository 1 Suppository(s) Rectal every 8 hours  chlorhexidine 2% Cloths 1 Application(s) Topical daily  dextrose 5%. 1000 milliLiter(s) (100 mL/Hr) IV Continuous <Continuous>  dextrose 5%. 1000 milliLiter(s) (50 mL/Hr) IV Continuous <Continuous>  dextrose 50% Injectable 25 Gram(s) IV Push once  dextrose 50% Injectable 12.5 Gram(s) IV Push once  dextrose 50% Injectable 25 Gram(s) IV Push once  glucagon  Injectable 1 milliGRAM(s) IntraMuscular once  heparin   Injectable 5000 Unit(s) SubCutaneous every 8 hours  hydrALAZINE 50 milliGRAM(s) Oral three times a day  hydrochlorothiazide 12.5 milliGRAM(s) Oral daily  insulin lispro (ADMELOG) corrective regimen sliding scale   SubCutaneous three times a day before meals  insulin lispro (ADMELOG) corrective regimen sliding scale   SubCutaneous at bedtime  lidocaine   4% Patch 1 Patch Transdermal once  lidocaine 1% Injectable 0.2 milliLiter(s) Local Injection once  losartan 100 milliGRAM(s) Oral daily  multivitamin/minerals 1 Tablet(s) Oral daily  senna 1 Tablet(s) Oral at bedtime  sodium chloride 0.45%. 1000 milliLiter(s) (75 mL/Hr) IV Continuous <Continuous>  sodium chloride 0.9% lock flush 3 milliLiter(s) IV Push every 8 hours      PHYSICAL EXAM:  T(C): 36.7 (08-26-22 @ 08:00), Max: 37.2 (08-26-22 @ 01:10)  HR: 87 (08-26-22 @ 08:00) (63 - 87)  BP: 158/74 (08-26-22 @ 05:44) (81/43 - 158/74)  RR: 18 (08-26-22 @ 08:00) (18 - 18)  SpO2: 95% (08-26-22 @ 08:00) (93% - 98%)  Wt(kg): --  I&O's Summary    25 Aug 2022 07:01  -  26 Aug 2022 07:00  --------------------------------------------------------  IN: 2935 mL / OUT: 2231 mL / NET: 704 mL    26 Aug 2022 07:01  -  26 Aug 2022 09:01  --------------------------------------------------------  IN: 0 mL / OUT: 355 mL / NET: -355 mL            JVP: Normal  Neck: supple  Lung: clear   CV: S1 S2 , Murmur:  Abd: soft  Ext: No edema  neuro: Awake / alert  Psych: flat affect  Skin: normal``    LABS/DATA:    CARDIAC MARKERS:                                10.9   10.67 )-----------( 172      ( 26 Aug 2022 06:51 )             35.7     08-26    137  |  99  |  34<H>  ----------------------------<  134<H>  4.2   |  27  |  2.44<H>    Ca    8.0<L>      26 Aug 2022 06:53      proBNP:   Lipid Profile:   HgA1c:   TSH:     TELE:  EKG:

## 2022-08-26 NOTE — PROVIDER CONTACT NOTE (OTHER) - BACKGROUND
pt w/ PMH CAD s/p x2 stents, HTN, HLD, DM and multp. spinal surgeries. currently admitted for prostactectomy pt w/ PMH CAD s/p x2 stents, HTN, HLD, DM and multp. spinal surgeries. currently admitted for prostactectomy w/ clamped CBI

## 2022-08-26 NOTE — DISCHARGE NOTE NURSING/CASE MANAGEMENT/SOCIAL WORK - PATIENT PORTAL LINK FT
You can access the FollowMyHealth Patient Portal offered by [facility] by registering at the following website: [email or web address]. By joining [employer]’s FollowMyHealth portal, you will also be able to view your health information using other applications (apps) compatible with our system.

## 2022-08-26 NOTE — PROGRESS NOTE ADULT - SUBJECTIVE AND OBJECTIVE BOX
Subjective  feeling well; overnight events of orthostasis reviewed.     Objective    Vital signs  T(F): , Max: 99 (08-26-22 @ 01:10)  HR: 80 (08-26-22 @ 05:44)  BP: 158/74 (08-26-22 @ 05:44)  SpO2: 98% (08-26-22 @ 05:44)  Wt(kg): --    Output     08-25 @ 07:01  -  08-26 @ 07:00  --------------------------------------------------------  IN: 2935 mL / OUT: 2231 mL / NET: 704 mL        Gen awake alert nad axox3  Abd  Back      Labs      08-25 @ 08:06    WBC 10.14 / Hct 36.8  / SCr 2.28     08-24 @ 12:57    WBC 10.58 / Hct 38.7  / SCr 2.20       Urine Cx: ?  Blood Cx: ?    Imaging Subjective  feeling well; overnight events of orthostasis reviewed.     Objective    Vital signs  T(F): , Max: 99 (08-26-22 @ 01:10)  HR: 80 (08-26-22 @ 05:44)  BP: 158/74 (08-26-22 @ 05:44)  SpO2: 98% (08-26-22 @ 05:44)  Wt(kg): --    Output     08-25 @ 07:01  -  08-26 @ 07:00  --------------------------------------------------------  IN: 2935 mL / OUT: 2231 mL / NET: 704 mL        Gen awake alert nad axox3  Abd obese soft ntnd   incision c/d/i   renata scant  serosang    ball light strawberry without clots off cbi     Labs      08-25 @ 08:06    WBC 10.14 / Hct 36.8  / SCr 2.28     08-24 @ 12:57    WBC 10.58 / Hct 38.7  / SCr 2.20

## 2022-08-26 NOTE — PROVIDER CONTACT NOTE (OTHER) - REASON
[facility]
Pt complaining of b/l hand numbness
Pt bradycardic to the 40s on tele

## 2022-08-26 NOTE — PROGRESS NOTE ADULT - ASSESSMENT
A/P: 71y Male s/p Robotic SPP 8/24/22    CBI off   Keep ball in   Abx   DVT prophylaxis/OOB  Incentive spirometry  Analgesia and antiemetics as needed  regular diet  AM labs  Afternoon NGUYEN creatinine  will call cardiologist regarding bradycardia and see if new.    
Sinus bradycardia with sinus arrhythmia   has frequent PACs makes overall HR on low side given compensatory pauses   asymptomatic   appears to be chronic  suspect underlying VERONICA   recommend outpt sleep study   overall stabl e    HTN  labile   cont current meds  given his CKD and mild SHANTHI, would hold hctz     DVT proph  on hep sq
A/P: 71y Male s/p Robotic SPP 8/24/22    CBI off   Keep ball in   Abx   DVT prophylaxis/OOB  Incentive spirometry  Analgesia and antiemetics as needed  regular diet  AM labs   NGUYEN creatinine today   anticipate discharge later today

## 2022-08-26 NOTE — DISCHARGE NOTE NURSING/CASE MANAGEMENT/SOCIAL WORK - NSDCPEFALRISK_GEN_ALL_CORE
For information on Fall & Injury Prevention, visit: [email or web address] OR  [email or web address] OR  https://www.cdc.gov/steadi/patient.html

## 2022-08-26 NOTE — PROVIDER CONTACT NOTE (OTHER) - ACTION/TREATMENT ORDERED:
ODESSA Mora made aware. hypotension resolved w/o any interventions. pt stated his BP drops ODESSA Mora made aware. hypotension resolved w/o any interventions. pt states his BP drops when he takes hydralazine, or any other BP pill, which is why he is non-compliant at home.

## 2022-08-26 NOTE — PROVIDER CONTACT NOTE (OTHER) - ASSESSMENT
pt A&Ox4. clammy after transfer to [facility], pt A&Ox4. clammy after transfer to [facility]. pt states he feels dizzy and faint. denies SOB/CP.

## 2022-09-06 ENCOUNTER — APPOINTMENT (OUTPATIENT)
Dept: UROLOGY | Facility: CLINIC | Age: 72
End: 2022-09-06

## 2022-09-06 PROCEDURE — 99024 POSTOP FOLLOW-UP VISIT: CPT

## 2022-09-06 PROCEDURE — 51798 US URINE CAPACITY MEASURE: CPT

## 2022-09-10 LAB — SURGICAL PATHOLOGY STUDY: SIGNIFICANT CHANGE UP

## 2022-09-11 NOTE — PHYSICAL EXAM
[General Appearance - Well Developed] : well developed [General Appearance - Well Nourished] : well nourished [Bowel Sounds] : normal bowel sounds [Abdomen Soft] : soft [Skin Color & Pigmentation] : normal skin color and pigmentation [Skin Turgor] : supple [Heart Rate And Rhythm] : Heart rate and rhythm were normal [] : no respiratory distress [Respiration, Rhythm And Depth] : normal respiratory rhythm and effort [Oriented To Time, Place, And Person] : oriented to person, place, and time [Not Anxious] : not anxious [Normal Station and Gait] : the gait and station were normal for the patient's age [No Focal Deficits] : no focal deficits [FreeTextEntry1] : incisions healing well

## 2022-09-11 NOTE — HISTORY OF PRESENT ILLNESS
[FreeTextEntry1] : HPI: Mr. Sullivan is a 72 yo M with an elevated PSA to 11.7 ng/mL who underwent a prostate biopsy on 12/3/2021 with  Dr. Duff and found to have BPH and nonspecific granulomatous prostatitis. He currently has some continued nocturia 1-2x at night, previously has had nocturia qhour at night, he's currently on flomax 0.8mg QHS and a number of herbal supplements he has purchased.\par \par Based on his MRI from 11/2021, found to have a volume of 116 cc although TRUS sizing around 90g. He underwent UDS today with synergic voiding, no overt DOs and blunted flow curve with minimal PVR (20cc) consisted with SANCHEZ. \par \par Anticoagulation: None\par All: NKDA\par Social: never smoker, no EtOH\par PMHx: CAD, Eelevated PSA, HTN, HLD\par FHx: acute MI, HTN, obesity\par PSHx: cervical spine surgery, CABG, quadricepsplasty\par \par Imaging: MRI as above\par \par 7/28:\par Here today with continued bladder outlet obstruction. Inconsistently taking the flomax and his supplements. Still voiding every 3-4 hours even at night. PVR Today 175 cc. Has been inconsistent with his urination and taking of medication. Cardiologist: Bc Andrews [address] [place] [address]\par \par 9/6:\par Here today s/p RASP, pathology pending. No n/v/f/c. Pain controlled and having some urinary frequency. No n/v/f/c. PVR today minimal. Discussed will resolve with time. No leg swelling. \par  [Urinary Incontinence] : no urinary incontinence [Urinary Retention] : no urinary retention [Urinary Urgency] : no urinary urgency [Urinary Frequency] : no urinary frequency

## 2022-09-15 NOTE — PROVIDER CONTACT NOTE (OTHER) - SITUATION
Problem: Potential for Falls  Goal: Patient will remain free of falls  Description: INTERVENTIONS:  - Educate patient/family on patient safety including physical limitations  - Instruct patient to call for assistance with activity   - Consult OT/PT to assist with strengthening/mobility   - Keep Call bell within reach  - Keep bed low and locked with side rails adjusted as appropriate  - Keep care items and personal belongings within reach  - Initiate and maintain comfort rounds  - Make Fall Risk Sign visible to staff  - Apply yellow socks and bracelet for high fall risk patients  - Consider moving patient to room near nurses station  Outcome: Progressing     Problem: MOBILITY - ADULT  Goal: Maintain or return to baseline ADL function  Description: INTERVENTIONS:  -  Assess patient's ability to carry out ADLs; assess patient's baseline for ADL function and identify physical deficits which impact ability to perform ADLs (bathing, care of mouth/teeth, toileting, grooming, dressing, etc )  - Assess/evaluate cause of self-care deficits   - Assess range of motion  - Assess patient's mobility; develop plan if impaired  - Assess patient's need for assistive devices and provide as appropriate  - Encourage maximum independence but intervene and supervise when necessary  - Involve family in performance of ADLs  - Assess for home care needs following discharge   - Consider OT consult to assist with ADL evaluation and planning for discharge  - Provide patient education as appropriate  Outcome: Progressing     Problem: INFECTION - ADULT  Goal: Absence or prevention of progression during hospitalization  Description: INTERVENTIONS:  - Assess and monitor for signs and symptoms of infection  - Monitor lab/diagnostic results  - Monitor all insertion sites, i e  indwelling lines, tubes, and drains  - Monitor endotracheal if appropriate and nasal secretions for changes in amount and color  - [place] appropriate cooling/warming
therapies per order  - Administer medications as ordered  - Instruct and encourage patient and family to use good hand hygiene technique  - Identify and instruct in appropriate isolation precautions for identified infection/condition  Outcome: Progressing     Problem: SAFETY ADULT  Goal: Patient will remain free of falls  Description: INTERVENTIONS:  - Educate patient/family on patient safety including physical limitations  - Instruct patient to call for assistance with activity   - Consult OT/PT to assist with strengthening/mobility   - Keep Call bell within reach  - Keep bed low and locked with side rails adjusted as appropriate  - Keep care items and personal belongings within reach  - Initiate and maintain comfort rounds  - Make Fall Risk Sign visible to staff  - Offer Toileting every 2 Hours, in advance of need  - Initiate/Maintain bed alarm  - Apply yellow socks and bracelet for high fall risk patients  - Consider moving patient to room near nurses station  Outcome: Progressing  Goal: Maintain or return to baseline ADL function  Description: INTERVENTIONS:  -  Assess patient's ability to carry out ADLs; assess patient's baseline for ADL function and identify physical deficits which impact ability to perform ADLs (bathing, care of mouth/teeth, toileting, grooming, dressing, etc )  - Assess/evaluate cause of self-care deficits   - Assess range of motion  - Assess patient's mobility; develop plan if impaired  - Assess patient's need for assistive devices and provide as appropriate  - Encourage maximum independence but intervene and supervise when necessary  - Involve family in performance of ADLs  - Assess for home care needs following discharge   - Consider OT consult to assist with ADL evaluation and planning for discharge  - Provide patient education as appropriate  Outcome: Progressing     Problem: DISCHARGE PLANNING  Goal: Discharge to home or other facility with appropriate resources  Description:
INTERVENTIONS:  - Identify barriers to discharge w/patient and caregiver  - Arrange for needed discharge resources and transportation as appropriate  - Identify discharge learning needs (meds, wound care, etc )  - Arrange for interpretive services to assist at discharge as needed  - Refer to Case Management Department for coordinating discharge planning if the patient needs post-hospital services based on physician/advanced practitioner order or complex needs related to functional status, cognitive ability, or social support system  Outcome: Progressing     Problem: Knowledge Deficit  Goal: Patient/family/caregiver demonstrates understanding of disease process, treatment plan, medications, and discharge instructions  Description: Complete learning assessment and assess knowledge base  Interventions:  - Provide teaching at level of understanding  - Provide teaching via preferred learning methods  Outcome: Progressing     Problem: SKIN/TISSUE INTEGRITY - ADULT  Goal: Pressure injury heals and does not worsen  Description: Interventions:  - Implement low air loss mattress or specialty surface (Criteria met)  - Apply silicone foam dressing  - Apply fecal or urinary incontinence containment device   - Perform passive or active ROM every shift/PRN  - Turn and reposition patient & offload bony prominences every 2 hours   - Utilize friction reducing device or surface for transfers   - Use incontinent care products after each incontinent episode such as pads, purewick  - Consider nutrition services referral as needed  Outcome: Progressing     Problem: Nutrition/Hydration-ADULT  Goal: Nutrient/Hydration intake appropriate for improving, restoring or maintaining nutritional needs  Description: Monitor and assess patient's nutrition/hydration status for malnutrition  Collaborate with interdisciplinary team and initiate plan and interventions as ordered  Monitor patient's weight and dietary intake as ordered or per policy 
Pt bradycardic to the 40s on tele
Utilize nutrition screening tool and intervene as necessary  Determine patient's food preferences and provide high-protein, high-caloric foods as appropriate       INTERVENTIONS:  - Monitor oral intake, urinary output, labs, and treatment plans  - Assess nutrition and hydration status and recommend course of action  - Evaluate amount of meals eaten  - Assist patient with eating if necessary   - Allow adequate time for meals  - Recommend/ encourage appropriate diets, oral nutritional supplements, and vitamin/mineral supplements  - Order, calculate, and assess calorie counts as needed  - Recommend, monitor, and adjust tube feedings and TPN/PPN based on assessed needs  - Assess need for intravenous fluids  - Provide specific nutrition/hydration education as appropriate  - Include patient/family/caregiver in decisions related to nutrition  Outcome: Progressing
[facility]
Pt complaining of b/l hand numbness

## 2022-09-22 ENCOUNTER — APPOINTMENT (OUTPATIENT)
Dept: UROLOGY | Facility: CLINIC | Age: 72
End: 2022-09-22

## 2022-09-22 VITALS
WEIGHT: 272 LBS | DIASTOLIC BLOOD PRESSURE: 88 MMHG | HEART RATE: 88 BPM | SYSTOLIC BLOOD PRESSURE: 158 MMHG | BODY MASS INDEX: 36.05 KG/M2 | HEIGHT: 73 IN | TEMPERATURE: 98.3 F | RESPIRATION RATE: 17 BRPM

## 2022-09-22 LAB — PSA SERPL-MCNC: 0.18 NG/ML

## 2022-09-22 PROCEDURE — 99024 POSTOP FOLLOW-UP VISIT: CPT

## 2022-09-22 PROCEDURE — 51798 US URINE CAPACITY MEASURE: CPT

## 2022-09-22 NOTE — PHYSICAL EXAM
[General Appearance - Well Developed] : well developed [General Appearance - Well Nourished] : well nourished [Bowel Sounds] : normal bowel sounds [Abdomen Soft] : soft [FreeTextEntry1] : incisions healing well [Skin Color & Pigmentation] : normal skin color and pigmentation [Skin Turgor] : supple [Heart Rate And Rhythm] : Heart rate and rhythm were normal [] : no respiratory distress [Respiration, Rhythm And Depth] : normal respiratory rhythm and effort [Oriented To Time, Place, And Person] : oriented to person, place, and time [Not Anxious] : not anxious [Normal Station and Gait] : the gait and station were normal for the patient's age [No Focal Deficits] : no focal deficits

## 2022-09-22 NOTE — HISTORY OF PRESENT ILLNESS
[FreeTextEntry1] : HPI: Mr. Sullivan is a 72 yo M with an elevated PSA to 11.7 ng/mL who underwent a prostate biopsy on 12/3/2021 with  Dr. Duff and found to have BPH and nonspecific granulomatous prostatitis. He currently has some continued nocturia 1-2x at night, previously has had nocturia qhour at night, he's currently on flomax 0.8mg QHS and a number of herbal supplements he has purchased.\par \par Based on his MRI from 11/2021, found to have a volume of 116 cc although TRUS sizing around 90g. He underwent UDS today with synergic voiding, no overt DOs and blunted flow curve with minimal PVR (20cc) consisted with SANCHEZ. \par \par Anticoagulation: None\par All: NKDA\par Social: never smoker, no EtOH\par PMHx: CAD, Eelevated PSA, HTN, HLD\par FHx: acute MI, HTN, obesity\par PSHx: cervical spine surgery, CABG, quadricepsplasty\par \par Imaging: MRI as above\par \par 7/28:\par Here today with continued bladder outlet obstruction. Inconsistently taking the flomax and his supplements. Still voiding every 3-4 hours even at night. PVR Today 175 cc. Has been inconsistent with his urination and taking of medication. Cardiologist: Bc Andrews [address] [place] [address]\par \par 9/6:\par Here today s/p RASP, pathology pending. No n/v/f/c. Pain controlled and having some urinary frequency. No n/v/f/c. PVR today minimal. Discussed will resolve with time. No leg swelling. \par \par 9/22:\par Here today s/p RASP. PAthology with 81g BPH and 1% focus of benign tissue GG1. Discussed active surveillance and to continue to watch this. Having some frequency. PVR Today 65 cc. \par  [Urinary Incontinence] : no urinary incontinence [Urinary Retention] : no urinary retention [Urinary Urgency] : no urinary urgency [Urinary Frequency] : no urinary frequency

## 2022-12-08 ENCOUNTER — APPOINTMENT (OUTPATIENT)
Dept: UROLOGY | Facility: CLINIC | Age: 72
End: 2022-12-08

## 2022-12-08 VITALS
HEART RATE: 66 BPM | RESPIRATION RATE: 19 BRPM | SYSTOLIC BLOOD PRESSURE: 177 MMHG | TEMPERATURE: 98.4 F | DIASTOLIC BLOOD PRESSURE: 79 MMHG | OXYGEN SATURATION: 97 %

## 2022-12-08 PROCEDURE — 99212 OFFICE O/P EST SF 10 MIN: CPT

## 2022-12-10 LAB — PSA SERPL-MCNC: 0.15 NG/ML

## 2022-12-10 NOTE — HISTORY OF PRESENT ILLNESS
[FreeTextEntry1] : HPI: Mr. Sullivan is a 72 yo M with an elevated PSA to 11.7 ng/mL who underwent a prostate biopsy on 12/3/2021 with  Dr. Duff and found to have BPH and nonspecific granulomatous prostatitis. He currently has some continued nocturia 1-2x at night, previously has had nocturia qhour at night, he's currently on flomax 0.8mg QHS and a number of herbal supplements he has purchased.\par \par Based on his MRI from 11/2021, found to have a volume of 116 cc although TRUS sizing around 90g. He underwent UDS today with synergic voiding, no overt DOs and blunted flow curve with minimal PVR (20cc) consisted with SANCHEZ. \par \par Anticoagulation: None\par All: NKDA\par Social: never smoker, no EtOH\par PMHx: CAD, Eelevated PSA, HTN, HLD\par FHx: acute MI, HTN, obesity\par PSHx: cervical spine surgery, CABG, quadricepsplasty\par \par Imaging: MRI as above\par \par 7/28:\par Here today with continued bladder outlet obstruction. Inconsistently taking the flomax and his supplements. Still voiding every 3-4 hours even at night. PVR Today 175 cc. Has been inconsistent with his urination and taking of medication. Cardiologist: Bc Andrews [address] [place] [address]\par \par 9/6:\par Here today s/p RASP, pathology pending. No n/v/f/c. Pain controlled and having some urinary frequency. No n/v/f/c. PVR today minimal. Discussed will resolve with time. No leg swelling. \par \par 9/22:\par Here today s/p RASP. PAthology with 81g BPH and 1% focus of benign tissue GG1. Discussed active surveillance and to continue to watch this. Having some frequency. PVR Today 65 cc. \par \par 12/8:\par Much improved frequency. Will continue active surveillance. PVR today 100cc, feels he has no urge to urinate, strong urinary flow.  [Urinary Incontinence] : no urinary incontinence [Urinary Retention] : no urinary retention [Urinary Urgency] : no urinary urgency [Urinary Frequency] : no urinary frequency

## 2023-01-04 NOTE — ED PROVIDER NOTE - ST/T WAVE
Quality 111:Pneumonia Vaccination Status For Older Adults: Pneumococcal vaccine (PPSV23) administered on or after patient’s 60th birthday and before the end of the measurement period Detail Level: Detailed no YASH

## 2023-01-13 NOTE — ED ADULT NURSE NOTE - PAIN RATING/NUMBER SCALE (0-10): REST
Follow-up in 1 year  We kindly ask that your arrive 15 minutes before your scheduled appointment time with your provider to allow our staff to room you, get your vital signs and update your chart  Get lab work done   Please call the office if you need a script  It is recommended to check with your insurance BEFORE getting labs done to make sure they are covered by your policy  Call our office if you have any problems with abdominal pain especially associated with fever, chills, nausea, vomiting or any other concerns  All  Post-bariatric surgery patients should be aware that very small quantities of any alcohol can cause impairment and it is very possible not to feel the effect  The effect can be in the system for several hours  It is also a stomach irritant  It is advised to AVOID alcohol, Nonsteroidal antiinflammatory drugs (NSAIDS) and nicotine of all forms   Any of these can cause stomach irritation/pain  Discussed the effects of alcohol on a bariatric patient and the increased impairment risk  Keep up the good work! 0

## 2023-03-02 ENCOUNTER — APPOINTMENT (OUTPATIENT)
Dept: UROLOGY | Facility: CLINIC | Age: 73
End: 2023-03-02
Payer: MEDICARE

## 2023-03-02 DIAGNOSIS — N45.1 EPIDIDYMITIS: ICD-10-CM

## 2023-03-02 PROCEDURE — 99213 OFFICE O/P EST LOW 20 MIN: CPT

## 2023-03-15 LAB
BACTERIA UR CULT: NORMAL
PSA SERPL-MCNC: 0.13 NG/ML

## 2023-03-15 NOTE — HISTORY OF PRESENT ILLNESS
[FreeTextEntry1] : HPI: Mr. Sullivan is a 72 yo M with an elevated PSA to 11.7 ng/mL who underwent a prostate biopsy on 12/3/2021 with  Dr. Duff and found to have BPH and nonspecific granulomatous prostatitis. He currently has some continued nocturia 1-2x at night, previously has had nocturia qhour at night, he's currently on flomax 0.8mg QHS and a number of herbal supplements he has purchased.\par \par Based on his MRI from 11/2021, found to have a volume of 116 cc although TRUS sizing around 90g. He underwent UDS today with synergic voiding, no overt DOs and blunted flow curve with minimal PVR (20cc) consisted with SANCHEZ. \par \par Anticoagulation: None\par All: NKDA\par Social: never smoker, no EtOH\par PMHx: CAD, Eelevated PSA, HTN, HLD\par FHx: acute MI, HTN, obesity\par PSHx: cervical spine surgery, CABG, quadricepsplasty\par \par Imaging: MRI as above\par \par 7/28:\par Here today with continued bladder outlet obstruction. Inconsistently taking the flomax and his supplements. Still voiding every 3-4 hours even at night. PVR Today 175 cc. Has been inconsistent with his urination and taking of medication. Cardiologist: Bc Andrews [address] [place] [address]\par \par 9/6:\par Here today s/p RASP, pathology pending. No n/v/f/c. Pain controlled and having some urinary frequency. No n/v/f/c. PVR today minimal. Discussed will resolve with time. No leg swelling. \par \par 9/22:\par Here today s/p RASP. PAthology with 81g BPH and 1% focus of benign tissue GG1. Discussed active surveillance and to continue to watch this. Having some frequency. PVR Today 65 cc. \par \par 12/8:\par Much improved frequency. Will continue active surveillance. PVR today 100cc, feels he has no urge to urinate, strong urinary flow. \par \par 3/2:\par Pathology with GG1, pathology 81g BPH otherwise. Pending PSA today. Discussed improved overactivity, down to nocturia x1. Strong urinary stream and voiding q4-6 hours. Having some bilateral testicular tenderness. NO n/v/f/c. No discharge.  [Urinary Incontinence] : no urinary incontinence [Urinary Retention] : no urinary retention [Urinary Urgency] : no urinary urgency [Urinary Frequency] : no urinary frequency

## 2023-03-15 NOTE — PHYSICAL EXAM
[General Appearance - Well Developed] : well developed [General Appearance - Well Nourished] : well nourished [Bowel Sounds] : normal bowel sounds [Abdomen Soft] : soft [Skin Color & Pigmentation] : normal skin color and pigmentation [Skin Turgor] : supple [Heart Rate And Rhythm] : Heart rate and rhythm were normal [] : no respiratory distress [Respiration, Rhythm And Depth] : normal respiratory rhythm and effort [Oriented To Time, Place, And Person] : oriented to person, place, and time [Not Anxious] : not anxious [Normal Station and Gait] : the gait and station were normal for the patient's age [No Focal Deficits] : no focal deficits [FreeTextEntry1] : bilateral epididymis tender

## 2023-04-03 ENCOUNTER — APPOINTMENT (OUTPATIENT)
Dept: NEUROSURGERY | Facility: CLINIC | Age: 73
End: 2023-04-03
Payer: MEDICARE

## 2023-04-03 ENCOUNTER — NON-APPOINTMENT (OUTPATIENT)
Age: 73
End: 2023-04-03

## 2023-04-03 VITALS
BODY MASS INDEX: 36.05 KG/M2 | OXYGEN SATURATION: 95 % | SYSTOLIC BLOOD PRESSURE: 153 MMHG | DIASTOLIC BLOOD PRESSURE: 95 MMHG | HEART RATE: 80 BPM | HEIGHT: 73 IN | WEIGHT: 272 LBS

## 2023-04-03 DIAGNOSIS — M54.9 DORSALGIA, UNSPECIFIED: ICD-10-CM

## 2023-04-03 PROCEDURE — 99213 OFFICE O/P EST LOW 20 MIN: CPT

## 2023-04-03 RX ORDER — DOXYCYCLINE HYCLATE 100 MG/1
100 TABLET ORAL
Qty: 90 | Refills: 0 | Status: DISCONTINUED | COMMUNITY
Start: 2023-03-02 | End: 2023-04-03

## 2023-04-03 RX ORDER — TAMSULOSIN HYDROCHLORIDE 0.4 MG/1
0.4 CAPSULE ORAL
Qty: 180 | Refills: 3 | Status: DISCONTINUED | COMMUNITY
Start: 2021-08-17 | End: 2023-04-03

## 2023-04-03 RX ORDER — CLOPIDOGREL BISULFATE 75 MG/1
75 TABLET, FILM COATED ORAL DAILY
Qty: 90 | Refills: 0 | Status: DISCONTINUED | COMMUNITY
Start: 2019-01-18 | End: 2023-04-03

## 2023-04-03 RX ORDER — DIAZEPAM 5 MG/1
5 TABLET ORAL
Qty: 1 | Refills: 0 | Status: DISCONTINUED | COMMUNITY
Start: 2021-11-23 | End: 2023-04-03

## 2023-04-03 RX ORDER — TAMSULOSIN HYDROCHLORIDE 0.4 MG/1
0.4 CAPSULE ORAL
Qty: 90 | Refills: 3 | Status: DISCONTINUED | COMMUNITY
Start: 2020-07-20 | End: 2023-04-03

## 2023-04-03 RX ORDER — RAMIPRIL 10 MG/1
10 CAPSULE ORAL DAILY
Refills: 0 | Status: DISCONTINUED | COMMUNITY
Start: 2016-11-17 | End: 2023-04-03

## 2023-04-03 RX ORDER — MV-MIN/FOLIC/K1/LYCOPEN/LUTEIN 300-60 MCG
TABLET ORAL
Refills: 0 | Status: DISCONTINUED | COMMUNITY
End: 2023-04-03

## 2023-04-03 RX ORDER — TAMSULOSIN HYDROCHLORIDE 0.4 MG/1
0.4 CAPSULE ORAL AT BEDTIME
Qty: 180 | Refills: 3 | Status: DISCONTINUED | COMMUNITY
Start: 2021-01-04 | End: 2023-04-03

## 2023-04-03 RX ORDER — TAMSULOSIN HYDROCHLORIDE 0.4 MG/1
0.4 CAPSULE ORAL
Qty: 30 | Refills: 1 | Status: DISCONTINUED | COMMUNITY
Start: 2020-02-05 | End: 2023-04-03

## 2023-04-14 ENCOUNTER — RX RENEWAL (OUTPATIENT)
Age: 73
End: 2023-04-14

## 2023-04-16 NOTE — PHYSICAL EXAM
[General Appearance - Alert] : alert [General Appearance - In No Acute Distress] : in no acute distress [General Appearance - Well Nourished] : well nourished [General Appearance - Well-Appearing] : healthy appearing [Oriented To Time, Place, And Person] : oriented to person, place, and time [Impaired Insight] : insight and judgment were intact [Affect] : the affect was normal [Memory Recent] : recent memory was not impaired [Person] : oriented to person [Place] : oriented to place [Time] : oriented to time [Short Term Intact] : short term memory intact [Motor Tone] : muscle tone was normal in all four extremities [Balance] : balance was intact [Sclera] : the sclera and conjunctiva were normal [PERRL With Normal Accommodation] : pupils were equal in size, round, reactive to light, with normal accommodation [Outer Ear] : the ears and nose were normal in appearance [Both Tympanic Membranes Were Examined] : both tympanic membranes were normal [Neck Appearance] : the appearance of the neck was normal [] : no respiratory distress [Respiration, Rhythm And Depth] : normal respiratory rhythm and effort [Apical Impulse] : the apical impulse was normal [Heart Rate And Rhythm] : heart rate was normal and rhythm regular [No CVA Tenderness] : no ~M costovertebral angle tenderness [No Spinal Tenderness] : no spinal tenderness [Skin Color & Pigmentation] : normal skin color and pigmentation [2+] : Brachioradialis left 2+ [1+] : Ankle jerk left 1+ [FreeTextEntry6] : Mild right foot dorsiflexion weakness [FreeTextEntry8] : Mild limping gait, asymmetrical

## 2023-04-16 NOTE — ASSESSMENT
[FreeTextEntry1] : IMPRESSION:\par \par \par Pt is stable after lumbar and cervical surgery. He has some pains and still with some issues with right leg giving way.Pt had the issue since last year and we held the images and was trying therapy.  We will get image for  his back and will decide about official therapy.\par \par PLAN:\par \par MRI Cervical spine w/o contrast \par MRI Thoracic spine w/o contrast \par MRI Lumbar spine w/o contrast \par CT cervical spine w/o contrast \par CT Lumbar spine w/o contrast \par \par

## 2023-04-16 NOTE — REVIEW OF SYSTEMS
[Arm Weakness] : arm weakness [Leg Weakness] : leg weakness [Joint Pain] : joint pain [Limb Pain] : limb pain [Negative] : Endocrine [Numbness] : no numbness [Tingling] : no tingling

## 2023-04-16 NOTE — HISTORY OF PRESENT ILLNESS
[FreeTextEntry1] : MIKKI CHO is a 68 year left-handed male who is here for a follow up visit. He is s/p C3-4, C4-5 ACDFI from 12/22/2014 and L1-2 lami with excision of large extruded disc on 8/12/2014. He was last seen on 6/6/2022 with weakness in LE. Today Pt returned with same issue reports  right leg giving out. Pt c/o occasional low back pain but weakness on his leg is more bothering at this time.  Pt c/o mild numbness and tingling in his LE. Pt was doing home exercises and therapy for helping with strengthening his muscles. He denies problems with manual dexterity and denies bowel and bladder problems.He is doing well otherwise. \par

## 2023-04-25 ENCOUNTER — RESULT REVIEW (OUTPATIENT)
Age: 73
End: 2023-04-25

## 2023-04-25 ENCOUNTER — APPOINTMENT (OUTPATIENT)
Dept: CT IMAGING | Facility: CLINIC | Age: 73
End: 2023-04-25
Payer: MEDICARE

## 2023-04-25 ENCOUNTER — APPOINTMENT (OUTPATIENT)
Dept: MRI IMAGING | Facility: CLINIC | Age: 73
End: 2023-04-25
Payer: MEDICARE

## 2023-04-25 ENCOUNTER — OUTPATIENT (OUTPATIENT)
Dept: OUTPATIENT SERVICES | Facility: HOSPITAL | Age: 73
LOS: 1 days | End: 2023-04-25
Payer: MEDICARE

## 2023-04-25 DIAGNOSIS — Z98.89 OTHER SPECIFIED POSTPROCEDURAL STATES: Chronic | ICD-10-CM

## 2023-04-25 DIAGNOSIS — Z98.890 OTHER SPECIFIED POSTPROCEDURAL STATES: Chronic | ICD-10-CM

## 2023-04-25 DIAGNOSIS — Z95.5 PRESENCE OF CORONARY ANGIOPLASTY IMPLANT AND GRAFT: Chronic | ICD-10-CM

## 2023-04-25 DIAGNOSIS — G95.9 DISEASE OF SPINAL CORD, UNSPECIFIED: ICD-10-CM

## 2023-04-25 PROCEDURE — 76376 3D RENDER W/INTRP POSTPROCES: CPT | Mod: 26

## 2023-04-25 PROCEDURE — 72131 CT LUMBAR SPINE W/O DYE: CPT | Mod: 26,MH

## 2023-04-25 PROCEDURE — 72141 MRI NECK SPINE W/O DYE: CPT | Mod: 26,MH

## 2023-04-25 PROCEDURE — 76376 3D RENDER W/INTRP POSTPROCES: CPT | Mod: 26,76

## 2023-04-25 PROCEDURE — 72125 CT NECK SPINE W/O DYE: CPT | Mod: 26,MH

## 2023-04-25 PROCEDURE — 72141 MRI NECK SPINE W/O DYE: CPT

## 2023-04-25 PROCEDURE — 72131 CT LUMBAR SPINE W/O DYE: CPT

## 2023-04-25 PROCEDURE — 72146 MRI CHEST SPINE W/O DYE: CPT | Mod: 26,MH

## 2023-04-25 PROCEDURE — 72148 MRI LUMBAR SPINE W/O DYE: CPT | Mod: 26,MH

## 2023-04-25 PROCEDURE — 72146 MRI CHEST SPINE W/O DYE: CPT

## 2023-04-25 PROCEDURE — 72125 CT NECK SPINE W/O DYE: CPT

## 2023-04-25 PROCEDURE — 76376 3D RENDER W/INTRP POSTPROCES: CPT

## 2023-04-25 PROCEDURE — 72148 MRI LUMBAR SPINE W/O DYE: CPT

## 2023-05-11 ENCOUNTER — APPOINTMENT (OUTPATIENT)
Dept: UROLOGY | Facility: CLINIC | Age: 73
End: 2023-05-11
Payer: MEDICARE

## 2023-05-11 VITALS
SYSTOLIC BLOOD PRESSURE: 180 MMHG | RESPIRATION RATE: 16 BRPM | BODY MASS INDEX: 35.78 KG/M2 | HEIGHT: 73 IN | WEIGHT: 270 LBS | HEART RATE: 65 BPM | DIASTOLIC BLOOD PRESSURE: 85 MMHG

## 2023-05-11 DIAGNOSIS — N45.1 EPIDIDYMITIS: ICD-10-CM

## 2023-05-11 PROCEDURE — 99212 OFFICE O/P EST SF 10 MIN: CPT

## 2023-05-13 PROBLEM — N45.1 RIGHT EPIDIDYMITIS: Status: ACTIVE | Noted: 2023-03-02

## 2023-05-13 NOTE — PHYSICAL EXAM
[General Appearance - Well Developed] : well developed [General Appearance - Well Nourished] : well nourished [Bowel Sounds] : normal bowel sounds [Abdomen Soft] : soft [FreeTextEntry1] : scars on abdomen noted, healed well [Skin Color & Pigmentation] : normal skin color and pigmentation [Skin Turgor] : supple [Heart Rate And Rhythm] : Heart rate and rhythm were normal [] : no respiratory distress [Respiration, Rhythm And Depth] : normal respiratory rhythm and effort [Oriented To Time, Place, And Person] : oriented to person, place, and time [Not Anxious] : not anxious [Normal Station and Gait] : the gait and station were normal for the patient's age [No Focal Deficits] : no focal deficits

## 2023-05-13 NOTE — HISTORY OF PRESENT ILLNESS
[FreeTextEntry1] : HPI: Mr. Sullivan is a 70 yo M with an elevated PSA to 11.7 ng/mL who underwent a prostate biopsy on 12/3/2021 with  Dr. Duff and found to have BPH and nonspecific granulomatous prostatitis. He currently has some continued nocturia 1-2x at night, previously has had nocturia qhour at night, he's currently on flomax 0.8mg QHS and a number of herbal supplements he has purchased.\par \par Based on his MRI from 11/2021, found to have a volume of 116 cc although TRUS sizing around 90g. He underwent UDS today with synergic voiding, no overt DOs and blunted flow curve with minimal PVR (20cc) consisted with SANCHEZ. \par \par Anticoagulation: None\par All: NKDA\par Social: never smoker, no EtOH\par PMHx: CAD, Eelevated PSA, HTN, HLD\par FHx: acute MI, HTN, obesity\par PSHx: cervical spine surgery, CABG, quadricepsplasty\par \par Imaging: MRI as above\par \par 7/28:\par Here today with continued bladder outlet obstruction. Inconsistently taking the flomax and his supplements. Still voiding every 3-4 hours even at night. PVR Today 175 cc. Has been inconsistent with his urination and taking of medication. Cardiologist: Bc Andrews [address] [place] [address]\par \par 9/6:\par Here today s/p RASP, pathology pending. No n/v/f/c. Pain controlled and having some urinary frequency. No n/v/f/c. PVR today minimal. Discussed will resolve with time. No leg swelling. \par \par 9/22:\par Here today s/p RASP. PAthology with 81g BPH and 1% focus of benign tissue GG1. Discussed active surveillance and to continue to watch this. Having some frequency. PVR Today 65 cc. \par \par 12/8:\par Much improved frequency. Will continue active surveillance. PVR today 100cc, feels he has no urge to urinate, strong urinary flow. \par \par 3/2:\par Pathology with GG1, pathology 81g BPH otherwise. Pending PSA today. Discussed improved overactivity, down to nocturia x1. Strong urinary stream and voiding q4-6 hours. Having some bilateral testicular tenderness. NO n/v/f/c. No discharge. \par \par 5/11:\par Doing much better, testicular tenderness has resolved. No n/v/f/c. No complaints of urinary overactivity. Nocturia x 1. No discharge, very happy with urinary flow. No UUI. Prior pathology with GG1, PSA 0.13 ng/mL. [Urinary Incontinence] : no urinary incontinence [Urinary Retention] : no urinary retention [Urinary Urgency] : no urinary urgency [Urinary Frequency] : no urinary frequency

## 2023-05-22 ENCOUNTER — APPOINTMENT (OUTPATIENT)
Dept: NEUROSURGERY | Facility: CLINIC | Age: 73
End: 2023-05-22
Payer: MEDICARE

## 2023-05-22 ENCOUNTER — RESULT REVIEW (OUTPATIENT)
Age: 73
End: 2023-05-22

## 2023-05-22 VITALS
SYSTOLIC BLOOD PRESSURE: 195 MMHG | HEART RATE: 72 BPM | DIASTOLIC BLOOD PRESSURE: 107 MMHG | BODY MASS INDEX: 35.78 KG/M2 | HEIGHT: 73 IN | OXYGEN SATURATION: 95 % | WEIGHT: 270 LBS

## 2023-05-22 PROCEDURE — 99214 OFFICE O/P EST MOD 30 MIN: CPT

## 2023-05-22 NOTE — RESULTS/DATA
[FreeTextEntry1] : EXAM: [id number] - MR SPINE LUMBAR - ORDERED BY: KENNETH DIALLO\par \par \par PROCEDURE DATE: 04/25/2023\par \par \par \par INTERPRETATION: CLINICAL INFORMATION: Back pain and numbness in the legs. Prior lumbar spine surgery.\par \par ADDITIONAL CLINICAL INFORMATION: Not Applicable\par \par TECHNIQUE: Multiplanar, multisequence MRI was performed of the lumbar spine.\par IV Contrast: NONE\par \par PRIOR STUDIES: Lumbar spine MRI 10/28/2014\par \par FINDINGS:\par \par LOCALIZER: No additional findings.\par BONES: Schmorl's node with mild edema at L1/L2 and L2/L3. Degenerative facet edema bilaterally at L4/L5.\par ALIGNMENT: Levo convexity of the lumbar spine. Loss of intervertebral disc height most notably at L1/L2 and L2/L3. Trace retrolisthesis of L2 on L3 and L3 on L4. Status post decompression at L1/L2.\par SACROILIAC JOINTS/SACRUM: There is no sacral fracture. The SI joints are partially visualized but are intact.\par CONUS AND CAUDA EQUINA: The distal cord and conus are normal in signal. Conus terminates at L1.\par VISUALIZED INTRAPELVIC/INTRA-ABDOMINAL SOFT TISSUES: Left mid pole renal T1 hyperintensity, most consistent with a hemorrhagic cyst\par PARASPINAL SOFT TISSUES: Normal.\par \par \par INDIVIDUAL LEVELS:\par \par LOWER THORACIC SPINE: No spinal canal or neuroforaminal stenosis.\par \par L1-L2: Status post decompression. Diffuse disc bulge resulting in mild right and mild-to-moderate left foraminal narrowing. No spinal canal narrowing.\par L2-L3: Disc bulge with posterior osseous ridging and bilateral facet arthrosis and thickening of the ligamentum flavum resulting in severe spinal canal narrowing and mild right and moderate left foraminal narrowing. Findings have progressed when compared with prior MRI.\par L3-L4: Disc bulge and prominent thickening of the ligamentum flavum resulting in severe spinal canal narrowing and mild left and moderate right foraminal narrowing. Findings have progressed when compared with prior exam.\par L4-L5: Small disc bulge with prominent thickening of the ligamentum flavum and facet arthrosis, resulting in severe spinal canal narrowing and mild to moderate left and moderate right foraminal narrowing. Findings have progressed from prior exam.\par L5-S1: Small disc bulge with posterior foraminal osseous ridging asymmetric to the left resulting in indentation of the thecal sac and mild foraminal narrowing.\par \par \par IMPRESSION:\par \par Progression of lumbar spondylosis with multilevel prominent thickening of the ligamentum flavum, from L2-L3 through L4/L5, resulting in severe spinal canal narrowing and mild to moderate foraminal narrowing at these levels.\par Status post decompression at L1/L2 without spinal canal narrowing at this level.\par \par --- End of Report ---\par \par EXAM: [id number] - CT 3D RECONSTRUCT WO [place] - ORDERED BY: KENNETH DIALLO\par \par EXAM: [id number] - CT LUMBAR SPINE - ORDERED BY: KENNETH DIALLO\par \par \par PROCEDURE DATE: 04/25/2023\par \par \par \par INTERPRETATION: LUMBOSACRAL SPINE CT\par \par CLINICAL INFORMATION: Back pain and numbness. History of spine surgery\par TECHNIQUE: Axial CT images were obtained of the lumbosacral spine with coronal and sagittal reconstructions. 3-D reformats were performed.\par \par COMPARISON: CT of the lumbar spine 8/12/2014\par \par FINDINGS:\par \par ALIGNMENT: Bones appear demineralized. Straightening of the lumbar lordosis. Status post decompression at L1/L2. Mild levo convexity of the lumbar spine. Multilevel loss of intervertebral disc height with endplate Schmorl's nodes, sclerosis, osteophyte formation and vacuum disc phenomenon. There is trace retrolisthesis of L2 on L3 and L3 on L4.\par \par DISC SPACES:\par \par L1/L2: Status post decompression. Disc osteophyte complex resulting in mild to moderate foraminal narrowing.\par \par L2/L3: Disc bulge with posterior osseous ridging asymmetric to the left and bilateral facet arthrosis and prominent thickening of the ligamentum flavum, resulting in severe spinal canal narrowing and moderate bilateral foraminal narrowing.\par \par L3/L4: Disc bulge and bilateral facet arthrosis and prominent thickening of the ligamentum flavum resulting in in severe spinal canal narrowing and mild to moderate foraminal narrowing.\par \par L4/L5: Disc bulge and bilateral facet arthrosis and prominent thickening of the ligamentum flavum resulting in severe spinal canal narrowing and mild to moderate bilateral foraminal narrowing.\par \par L5/S1: Small disc osteophyte complex resulting in mild to moderate foraminal narrowing. No spinal canal narrowing.\par \par SI JOINTS: Intact.\par INTRAABDOMINAL AND INTRAPELVIC SOFT TISSUES: Vascular calcifications.\par \par IMPRESSION:\par Lumbar spondylosis with prominent thickening of the ligamentum flavum most notably at L2/3, L3/L4 and L4/L5, resulting in severe spinal canal narrowing and mild to moderate foraminal narrowing, progressed from prior exam.\par Status post decompression at L1/L2 without spinal canal stenosis.\par \par EXAM: [id number] - CT 3D RECONSTRUCT WO [place] - ORDERED BY: KENNETH DIALLO\par \par EXAM: [id number] - CT CERVICAL SPINE - ORDERED BY: KENNETH DIALLO\par \par \par PROCEDURE DATE: 04/25/2023\par \par \par \par INTERPRETATION: CERVICAL SPINE CT\par \par CLINICAL INFORMATION: Status post spinal surgery. Back pain and leg weakness\par TECHNIQUE: Axial CT images were obtained of the cervical spine with coronal and sagittal reconstructions. 3-D reformats were performed.\par \par COMPARISON: CT of the cervical spine 8/12/2014\par \par FINDINGS:\par \par ALIGNMENT: Preservation of the vertebral body heights. Status post ACDF from C3 to C5 with anterior fixation plate and screws and interbody devices. The most inferior screw extends into the C5/C6 intervertebral disc space of which the screw head appears mildly proud. There is interbody and facet fusion at C3/C4. Multilevel loss of intervertebral disc height with endplate degenerative changes and osteophyte formation. Endplate sclerosis with vacuum phenomenon is present at C6/C7. Prominent anterior osteophyte extends from the anterior superior endplate of C3 cranially along the anterior aspect of C2.\par \par DISC SPACES:\par \par C1/2: Atlantodental degenerative changes.\par C2/C3: Small disc bulge. Left facet arthrosis. Mild to moderate left foraminal narrowing.\par C3/C4: Posterior osseous ridging and bilateral uncovertebral spurring and facet arthrosis. Moderate to severe left foraminal narrowing.\par C4/C5: Posterior osseous ridging and uncovertebral spurring resulting moderate to severe foraminal narrowing.\par C5/C6: Disc osteophyte complex and bilateral uncovertebral spurring resulting mild spinal canal narrowing and moderate to severe foraminal narrowing.\par C6/C7: Disc osteophyte complex and bilateral uncovertebral spurring resulting mild spinal canal narrowing and moderate to severe foraminal narrowing.\par C7/T1: Disc osteophyte complex and uncovertebral spurring resulting in mild to moderate spinal canal narrowing and severe foraminal narrowing.\par \par NECK AND PERIPHERAL SOFT TISSUES: Unremarkable\par \par \par IMPRESSION:\par Status post ACDF from C3 to C5. The most inferior screw traverses the C5/C6 disc space of which the head of the inferior screw is mildly proud.\par Cervical spondylosis resulting in multilevel moderate to severe foraminal narrowing. Mild to moderate spinal canal narrowing at C7/T1.\par \par --- End of Report ---\par \par EXAM: [id number] - MR SPINE THORACIC - ORDERED BY: KENNETH DIALLO\par \par EXAM: [id number] - MR SPINE CERVICAL - ORDERED BY: KENNETH DIALLO\par \par \par PROCEDURE DATE: 04/25/2023\par \par \par \par INTERPRETATION: CERVICAL SPINE and THORACIC SPINE MRI\par \par CLINICAL INFORMATION: Cervical fusion. Back pain and numbness in the legs.\par TECHNIQUE: Multiplanar, multisequence MRI was obtained of the cervical spine and thoracic spine.\par \par COMPARISON: Cervical and thoracic spine MRI 10/28/2014.\par \par FINDINGS:\par \par Cervical disc level evaluation:\par \par C1/2: Atlantodental degenerative changes. No spinal canal narrowing.\par C2/C3: Small disc bulge. Left facet arthrosis. Mild left foraminal narrowing.\par C3/C4: Status post ACDF. Minimal uncovertebral spurring. Bilateral facet arthrosis. Moderate right and moderate to severe left foraminal narrowing.\par C4/C5: Status post ACDF. Posterior osseous ridging and bilateral uncovertebral spurring resulting mild spinal canal narrowing asymmetric to the left. Moderate to severe bilateral foraminal narrowing.\par C5/C6: Disc bulge with posterior and foraminal osseous ridging, resulting mild spinal canal narrowing and moderate left and severe right foraminal narrowing.\par C6/C7: Small disc bulge with posterior osseous ridging resulting in mild spinal canal narrowing asymmetric to the left and moderate bilateral foraminal narrowing.\par C7/T1: Disc osteophyte complex bilateral uncovertebral spurring resulting mild to moderate spinal canal narrowing and moderate to severe bilateral foraminal narrowing.\par \par Evaluation of the levels of the thoracic spine demonstrates:\par T1/T2: No spinal canal or foraminal narrowing\par T2/T3: Small disc bulge resulting in indentation of the thecal sac and minimal right foraminal narrowing.\par T3/T4: Small right paracentral protrusion indenting the thecal sac. No foraminal narrowing.\par T4/T5: Tiny left paracentral protrusion without spinal canal foraminal or narrowing.\par T5/T6: Small right paracentral protrusion indenting the thecal sac. No foraminal narrowing.\par T6/T7: Diffuse disc bulge resulting in indentation of the ventral cord. Minimal foraminal narrowing.\par T7/T8: No spinal canal or foraminal narrowing.\par T8/T9: Small disc bulge resulting in mild indentation of the ventral cord and mild foraminal narrowing.\par T9/T10: No spinal canal or foraminal narrowing.\par T10/T11: Disc bulge asymmetric to the left resulting in indentation of the thecal sac and mild left foraminal narrowing.\par T11/T12: No spinal canal or foraminal narrowing. Bilateral facet arthrosis.\par \par \par ALIGNMENT: Preservation of the vertebral body heights. Straightening of the cervical lordosis. Status post ACDF from C3 to C5 with anterior fixation plate and screws and interbody devices. The most distal screw appears to be at the level of the C5/C6 intervertebral disc space. There is multilevel loss of intervertebral disc height. Dextroconvexity of the midthoracic spine\par CORD: T2 prolongation with cortical volume loss is present within the left hemicord at the level C4/C5, consistent with myelomalacia, unchanged from prior exam.\par MARROW: Prominent degenerative endplate edema at C7/T1.\par IMAGED BRAIN: Unremarkable.\par SOFT TISSUES: Fluid within the bilateral mastoid air cells. Bilateral sternoclavicular arthrosis. Partially imaged chest is unremarkable. Partially imaged renal T2 hyperintensities, favored to represent cysts.\par \par IMPRESSION:\par Status post ACDF from C3 to C5 of which the most inferior screw traverses the C5/C6 disc space.\par Spondylosis resulting mild spinal canal narrowing at C6/C7 and mild to moderate spinal canal narrowing at C7/T1. Multilevel moderate to severe foraminal narrowing.\par Prominent degenerative endplate edema at C7/T1.\par Unchanged myelomalacia at the level C4/C5 as on prior exam.\par \par Thoracic spondylosis, most notably at T6/T7 and T7/T8 with disc herniations indenting the ventral cord and mild foraminal narrowing, progressed from prior exam.\par \par

## 2023-05-22 NOTE — ASSESSMENT
[FreeTextEntry1] : IMPRESSION:\par Pt is stable after lumbar and cervical surgery. He has some pains and still with some issues with left leg difficulty climbing stiars.  Has progressive lumbar stenosis, now moderate to severe L2-L5.  He has progressive C7T1 Modic changes and disc degeneration, but mild stenosis of canal.  Needs PT to strengthen. If no change, then consider lumbar laminectomy +/- fusion depending upon flex/ext. Pt did not take BP meds this morning and took then in the office.  \par \par PLAN:\par \par PT\par Lumbar flex/Ext\par Scoliosis 36 in for sag alignment.\par Monitor BP

## 2023-05-22 NOTE — HISTORY OF PRESENT ILLNESS
[FreeTextEntry1] : MIKKI CHO is a 68 year left-handed male who is here for a follow up visit. He is s/p C3-4, C4-5 ACDFI from 12/22/2014 and L1-2 lami with excision of large extruded disc on 8/12/2014. He was last seen on 6/6/2022 with weakness in LE.  Pt returned with same issue reports right leg giving out. Pt c/o occasional low back pain but weakness on his leg is more bothering at this time. Pt c/o mild numbness and tingling in his LE. Pt was doing home exercises and therapy for helping with strengthening his muscles. He denies problems with manual dexterity and denies bowel and bladder problems.He is doing well otherwise. \par \par Today.  Pt feels ok.  He had no further episodes of leg giving way.  He has a hard time getting up stairs with the leg leg more difficult to lift.  left leg gave our previously, but has motor weakness in right leg from previously. Right leg is the stronger leg when he walks up the stairs. He has no back pain and only occasional left neck pain.  No radicular pain to legs.

## 2023-05-22 NOTE — PHYSICAL EXAM
[General Appearance - Alert] : alert [General Appearance - In No Acute Distress] : in no acute distress [FreeTextEntry6] : Right leg with 4/5 hip flexor and 3/5 right dorsiflexion.  5/5 all other groups. [FreeTextEntry8] : Gait is mildly asymmetrical with mild slapping of right foot due to foot drop.  Gait moderately labored.

## 2023-07-27 PROBLEM — G95.9 CERVICAL MYELOPATHY: Status: ACTIVE | Noted: 2017-06-16

## 2023-07-27 PROBLEM — M47.812 CERVICAL SPONDYLOSIS: Status: ACTIVE | Noted: 2017-06-16

## 2023-07-31 ENCOUNTER — APPOINTMENT (OUTPATIENT)
Dept: NEUROSURGERY | Facility: CLINIC | Age: 73
End: 2023-07-31
Payer: MEDICARE

## 2023-07-31 VITALS
OXYGEN SATURATION: 94 % | HEIGHT: 75 IN | HEART RATE: 72 BPM | WEIGHT: 270 LBS | BODY MASS INDEX: 33.57 KG/M2 | SYSTOLIC BLOOD PRESSURE: 191 MMHG | DIASTOLIC BLOOD PRESSURE: 101 MMHG

## 2023-07-31 DIAGNOSIS — M47.812 SPONDYLOSIS W/OUT MYELOPATHY OR RADICULOPATHY, CERVICAL REGION: ICD-10-CM

## 2023-07-31 DIAGNOSIS — G95.9 DISEASE OF SPINAL CORD, UNSPECIFIED: ICD-10-CM

## 2023-07-31 DIAGNOSIS — M51.26 OTHER INTERVERTEBRAL DISC DISPLACEMENT, LUMBAR REGION: ICD-10-CM

## 2023-07-31 PROCEDURE — 99214 OFFICE O/P EST MOD 30 MIN: CPT

## 2023-08-05 NOTE — RESULTS/DATA
[FreeTextEntry1] : PROCEDURE DATE: 04/25/2023    INTERPRETATION: CLINICAL INFORMATION: Back pain and numbness in the legs. Prior lumbar spine surgery.  ADDITIONAL CLINICAL INFORMATION: Not Applicable  TECHNIQUE: Multiplanar, multisequence MRI was performed of the lumbar spine. IV Contrast: NONE  PRIOR STUDIES: Lumbar spine MRI 10/28/2014  FINDINGS:  LOCALIZER: No additional findings. BONES: Schmorl's node with mild edema at L1/L2 and L2/L3. Degenerative facet edema bilaterally at L4/L5. ALIGNMENT: Levo convexity of the lumbar spine. Loss of intervertebral disc height most notably at L1/L2 and L2/L3. Trace retrolisthesis of L2 on L3 and L3 on L4. Status post decompression at L1/L2. SACROILIAC JOINTS/SACRUM: There is no sacral fracture. The SI joints are partially visualized but are intact. CONUS AND CAUDA EQUINA: The distal cord and conus are normal in signal. Conus terminates at L1. VISUALIZED INTRAPELVIC/INTRA-ABDOMINAL SOFT TISSUES: Left mid pole renal T1 hyperintensity, most consistent with a hemorrhagic cyst PARASPINAL SOFT TISSUES: Normal.   INDIVIDUAL LEVELS:  LOWER THORACIC SPINE: No spinal canal or neuroforaminal stenosis.  L1-L2: Status post decompression. Diffuse disc bulge resulting in mild right and mild-to-moderate left foraminal narrowing. No spinal canal narrowing. L2-L3: Disc bulge with posterior osseous ridging and bilateral facet arthrosis and thickening of the ligamentum flavum resulting in severe spinal canal narrowing and mild right and moderate left foraminal narrowing. Findings have progressed when compared with prior MRI. L3-L4: Disc bulge and prominent thickening of the ligamentum flavum resulting in severe spinal canal narrowing and mild left and moderate right foraminal narrowing. Findings have progressed when compared with prior exam. L4-L5: Small disc bulge with prominent thickening of the ligamentum flavum and facet arthrosis, resulting in severe spinal canal narrowing and mild to moderate left and moderate right foraminal narrowing. Findings have progressed from prior exam. L5-S1: Small disc bulge with posterior foraminal osseous ridging asymmetric to the left resulting in indentation of the thecal sac and mild foraminal narrowing.   IMPRESSION:  Progression of lumbar spondylosis with multilevel prominent thickening of the ligamentum flavum, from L2-L3 through L4/L5, resulting in severe spinal canal narrowing and mild to moderate foraminal narrowing at these levels. Status post decompression at L1/L2 without spinal canal narrowing at this level.  EXAM: [id number] - MR SPINE THORACIC - ORDERED BY: KENNETH DIALLO  EXAM: [id number] - MR SPINE CERVICAL - ORDERED BY: KENNETH DIALLO   PROCEDURE DATE: 04/25/2023    INTERPRETATION: CERVICAL SPINE and THORACIC SPINE MRI  CLINICAL INFORMATION: Cervical fusion. Back pain and numbness in the legs. TECHNIQUE: Multiplanar, multisequence MRI was obtained of the cervical spine and thoracic spine.  COMPARISON: Cervical and thoracic spine MRI 10/28/2014.  FINDINGS:  Cervical disc level evaluation:  C1/2: Atlantodental degenerative changes. No spinal canal narrowing. C2/C3: Small disc bulge. Left facet arthrosis. Mild left foraminal narrowing. C3/C4: Status post ACDF. Minimal uncovertebral spurring. Bilateral facet arthrosis. Moderate right and moderate to severe left foraminal narrowing. C4/C5: Status post ACDF. Posterior osseous ridging and bilateral uncovertebral spurring resulting mild spinal canal narrowing asymmetric to the left. Moderate to severe bilateral foraminal narrowing. C5/C6: Disc bulge with posterior and foraminal osseous ridging, resulting mild spinal canal narrowing and moderate left and severe right foraminal narrowing. C6/C7: Small disc bulge with posterior osseous ridging resulting in mild spinal canal narrowing asymmetric to the left and moderate bilateral foraminal narrowing. C7/T1: Disc osteophyte complex bilateral uncovertebral spurring resulting mild to moderate spinal canal narrowing and moderate to severe bilateral foraminal narrowing.  Evaluation of the levels of the thoracic spine demonstrates: T1/T2: No spinal canal or foraminal narrowing T2/T3: Small disc bulge resulting in indentation of the thecal sac and minimal right foraminal narrowing. T3/T4: Small right paracentral protrusion indenting the thecal sac. No foraminal narrowing. T4/T5: Tiny left paracentral protrusion without spinal canal foraminal or narrowing. T5/T6: Small right paracentral protrusion indenting the thecal sac. No foraminal narrowing. T6/T7: Diffuse disc bulge resulting in indentation of the ventral cord. Minimal foraminal narrowing. T7/T8: No spinal canal or foraminal narrowing. T8/T9: Small disc bulge resulting in mild indentation of the ventral cord and mild foraminal narrowing. T9/T10: No spinal canal or foraminal narrowing. T10/T11: Disc bulge asymmetric to the left resulting in indentation of the thecal sac and mild left foraminal narrowing. T11/T12: No spinal canal or foraminal narrowing. Bilateral facet arthrosis.   ALIGNMENT: Preservation of the vertebral body heights. Straightening of the cervical lordosis. Status post ACDF from C3 to C5 with anterior fixation plate and screws and interbody devices. The most distal screw appears to be at the level of the C5/C6 intervertebral disc space. There is multilevel loss of intervertebral disc height. Dextroconvexity of the midthoracic spine CORD: T2 prolongation with cortical volume loss is present within the left hemicord at the level C4/C5, consistent with myelomalacia, unchanged from prior exam. MARROW: Prominent degenerative endplate edema at C7/T1. IMAGED BRAIN: Unremarkable. SOFT TISSUES: Fluid within the bilateral mastoid air cells. Bilateral sternoclavicular arthrosis. Partially imaged chest is unremarkable. Partially imaged renal T2 hyperintensities, favored to represent cysts.  IMPRESSION: Status post ACDF from C3 to C5 of which the most inferior screw traverses the C5/C6 disc space. Spondylosis resulting mild spinal canal narrowing at C6/C7 and mild to moderate spinal canal narrowing at C7/T1. Multilevel moderate to severe foraminal narrowing. Prominent degenerative endplate edema at C7/T1. Unchanged myelomalacia at the level C4/C5 as on prior exam.  Thoracic spondylosis, most notably at T6/T7 and T7/T8 with disc herniations indenting the ventral cord and mild foraminal narrowing, progressed from prior exam.

## 2023-08-05 NOTE — HISTORY OF PRESENT ILLNESS
[FreeTextEntry1] : MIKKI CHO is a 72 year left-handed male who is here for a follow up visit. He is s/p C3-4, C4-5 ACDFI from 2014 and L1-2 lami with excision of large extruded disc on 2014. He was last seen on 2022 with weakness in LE.  Pt returned with same issue reports right leg giving out. Pt c/o occasional low back pain but weakness on his leg is more bothering at this time. Pt c/o mild numbness and tingling in his LE. Pt was doing home exercises and therapy for helping with strengthening his muscles. He denies problems with manual dexterity and denies bowel and bladder problems.He is doing well otherwise.   He was last seen on 23 with difficulty getting upstairs with leg giving way. He is still having some issues with this.  He recently underwent imaging.  He has been unable to do physical therapy because his son had been sick and recently  of heart issues.

## 2023-08-05 NOTE — ASSESSMENT
[FreeTextEntry1] : IMPRESSION: Pt is stable after lumbar and cervical surgery. He has some pains and still with some issues with left leg difficulty climbing stiars.  Has progressive lumbar stenosis, now moderate to severe L2-L5.  He has progressive C7T1 Modic changes and disc degeneration, but mild stenosis of canal.  Needs PT to strengthen. If no change, then consider lumbar laminectomy +/- fusion depending upon flex/ext.   PLAN: -PT -Lumbar flex/Ext -Scoliosis 36 in for sag alignment. -Follow up after scans

## 2023-08-08 ENCOUNTER — APPOINTMENT (OUTPATIENT)
Dept: RADIOLOGY | Facility: CLINIC | Age: 73
End: 2023-08-08
Payer: MEDICARE

## 2023-08-08 ENCOUNTER — OUTPATIENT (OUTPATIENT)
Dept: OUTPATIENT SERVICES | Facility: HOSPITAL | Age: 73
LOS: 1 days | End: 2023-08-08
Payer: MEDICARE

## 2023-08-08 DIAGNOSIS — Z98.890 OTHER SPECIFIED POSTPROCEDURAL STATES: Chronic | ICD-10-CM

## 2023-08-08 DIAGNOSIS — Z98.89 OTHER SPECIFIED POSTPROCEDURAL STATES: Chronic | ICD-10-CM

## 2023-08-08 DIAGNOSIS — M51.26 OTHER INTERVERTEBRAL DISC DISPLACEMENT, LUMBAR REGION: ICD-10-CM

## 2023-08-08 DIAGNOSIS — Z95.5 PRESENCE OF CORONARY ANGIOPLASTY IMPLANT AND GRAFT: Chronic | ICD-10-CM

## 2023-08-08 PROCEDURE — 72120 X-RAY BEND ONLY L-S SPINE: CPT | Mod: 26,XS

## 2023-08-08 PROCEDURE — 72082 X-RAY EXAM ENTIRE SPI 2/3 VW: CPT

## 2023-08-08 PROCEDURE — 72082 X-RAY EXAM ENTIRE SPI 2/3 VW: CPT | Mod: 26

## 2023-08-08 PROCEDURE — 72084 X-RAY EXAM ENTIRE SPI 6/> VW: CPT

## 2023-08-08 PROCEDURE — 72120 X-RAY BEND ONLY L-S SPINE: CPT

## 2023-09-29 ENCOUNTER — APPOINTMENT (OUTPATIENT)
Dept: RADIOLOGY | Facility: IMAGING CENTER | Age: 73
End: 2023-09-29
Payer: MEDICARE

## 2023-09-29 ENCOUNTER — APPOINTMENT (OUTPATIENT)
Dept: NEUROSURGERY | Facility: CLINIC | Age: 73
End: 2023-09-29
Payer: MEDICARE

## 2023-09-29 ENCOUNTER — OUTPATIENT (OUTPATIENT)
Dept: OUTPATIENT SERVICES | Facility: HOSPITAL | Age: 73
LOS: 1 days | End: 2023-09-29
Payer: MEDICARE

## 2023-09-29 VITALS — SYSTOLIC BLOOD PRESSURE: 153 MMHG | DIASTOLIC BLOOD PRESSURE: 85 MMHG

## 2023-09-29 VITALS
OXYGEN SATURATION: 93 % | SYSTOLIC BLOOD PRESSURE: 162 MMHG | WEIGHT: 270 LBS | DIASTOLIC BLOOD PRESSURE: 80 MMHG | HEIGHT: 75 IN | BODY MASS INDEX: 33.57 KG/M2 | HEART RATE: 69 BPM

## 2023-09-29 DIAGNOSIS — Z98.89 OTHER SPECIFIED POSTPROCEDURAL STATES: Chronic | ICD-10-CM

## 2023-09-29 DIAGNOSIS — Z98.890 OTHER SPECIFIED POSTPROCEDURAL STATES: Chronic | ICD-10-CM

## 2023-09-29 DIAGNOSIS — M51.26 OTHER INTERVERTEBRAL DISC DISPLACEMENT, LUMBAR REGION: ICD-10-CM

## 2023-09-29 DIAGNOSIS — Z95.5 PRESENCE OF CORONARY ANGIOPLASTY IMPLANT AND GRAFT: Chronic | ICD-10-CM

## 2023-09-29 PROCEDURE — 72084 X-RAY EXAM ENTIRE SPI 6/> VW: CPT | Mod: 26

## 2023-09-29 PROCEDURE — 72120 X-RAY BEND ONLY L-S SPINE: CPT

## 2023-09-29 PROCEDURE — 72083 X-RAY EXAM ENTIRE SPI 4/5 VW: CPT

## 2023-09-29 PROCEDURE — 72082 X-RAY EXAM ENTIRE SPI 2/3 VW: CPT

## 2023-09-29 PROCEDURE — 99213 OFFICE O/P EST LOW 20 MIN: CPT

## 2023-09-29 RX ORDER — HYDRALAZINE HYDROCHLORIDE 50 MG/1
50 TABLET ORAL
Refills: 0 | Status: ACTIVE | COMMUNITY

## 2023-09-29 RX ORDER — KRILL/OM-3/DHA/EPA/PHOSPHO/AST 1000-230MG
81 CAPSULE ORAL
Refills: 0 | Status: ACTIVE | COMMUNITY

## 2023-09-29 RX ORDER — ATORVASTATIN CALCIUM 10 MG/1
10 TABLET, FILM COATED ORAL
Refills: 0 | Status: ACTIVE | COMMUNITY

## 2023-09-29 RX ORDER — AMLODIPINE BESYLATE 10 MG/1
10 TABLET ORAL
Refills: 0 | Status: ACTIVE | COMMUNITY

## 2023-09-29 RX ORDER — METOPROLOL SUCCINATE 25 MG/1
25 TABLET, EXTENDED RELEASE ORAL
Refills: 0 | Status: ACTIVE | COMMUNITY

## 2023-09-29 RX ORDER — OLMESARTAN MEDOXOMIL AND HYDROCHLOROTHIAZIDE 40; 12.5 MG/1; MG/1
40-12.5 TABLET ORAL
Refills: 0 | Status: ACTIVE | COMMUNITY

## 2023-11-27 ENCOUNTER — RX RENEWAL (OUTPATIENT)
Age: 73
End: 2023-11-27

## 2023-12-04 ENCOUNTER — APPOINTMENT (OUTPATIENT)
Dept: UROLOGY | Facility: CLINIC | Age: 73
End: 2023-12-04
Payer: MEDICARE

## 2023-12-04 PROCEDURE — 99214 OFFICE O/P EST MOD 30 MIN: CPT

## 2023-12-05 LAB — PSA SERPL-MCNC: 0.13 NG/ML

## 2023-12-07 NOTE — CONSULT NOTE ADULT - PROBLEM SELECTOR RECOMMENDATION 2
Primary Diagnosis:  MDD (major depressive disorder) [F32.9]        Problem Dx:   H/O fibromyalgia [Z87.39]      Thyroid cancer [C73]      Asthma [J45.909]       Primary Diagnosis:  MDD (major depressive disorder) [F32.9]        Problem Dx:   H/O fibromyalgia [Z87.39]      Thyroid cancer [C73]      Asthma [J45.909]       Primary Diagnosis:  MDD (major depressive disorder) [F32.9]        Problem Dx:   H/O fibromyalgia [Z87.39]      Thyroid cancer [C73]      Asthma [J45.909]       Primary Diagnosis:  MDD (major depressive disorder) [F32.9]        Problem Dx:   H/O fibromyalgia [Z87.39]      Thyroid cancer [C73]      Asthma [J45.909]       History of prior OM PCI in 2010.   No active ischemic symptoms.   Continue ASA Primary Diagnosis:  MDD (major depressive disorder) [F32.9]        Problem Dx:   H/O fibromyalgia [Z87.39]      Thyroid cancer [C73]      Asthma [J45.909]

## 2023-12-15 ENCOUNTER — APPOINTMENT (OUTPATIENT)
Dept: NEUROSURGERY | Facility: CLINIC | Age: 73
End: 2023-12-15
Payer: MEDICARE

## 2023-12-15 VITALS
BODY MASS INDEX: 33.57 KG/M2 | HEART RATE: 76 BPM | WEIGHT: 270 LBS | HEIGHT: 75 IN | DIASTOLIC BLOOD PRESSURE: 78 MMHG | OXYGEN SATURATION: 93 % | SYSTOLIC BLOOD PRESSURE: 123 MMHG

## 2023-12-15 PROCEDURE — 99214 OFFICE O/P EST MOD 30 MIN: CPT

## 2023-12-26 NOTE — RESULTS/DATA
[FreeTextEntry1] : EXAM: [id number] - XR LS SPINE W BEND ONLY 2-3V - ORDERED BY: KENNETH DIALLO  EXAM: [id number] - XR SCOLIOSIS AP LAT 2V - ORDERED BY: KENNETH DIALLO   PROCEDURE DATE: 09/29/2023    INTERPRETATION: CLINICAL INDICATION: progressive pain  EXAM: Long plate frontal and lateral views of the spinal column and rightward and leftward bending frontal view the lumbar spine from 9/29/2023 at 1746. Compared to prior study from 8/8/2023.  IMPRESSION: Unremarkable partially visualized inferior end of multilevel cervical anterior fusion hardware.  No compression fractures, spondylolistheses, spondylolysis defects.  Multilevel degenerative disease manifest by varying degrees of disc space narrowing with disc margin osteophyte formation and posterior facet arthrosis. Unremarkable multilevel mid cervical anterior fusion hardware.  Broad spinal dextrocurvature, decreases with rightward bending and slightly exaggerates with leftward bending. Trace negative coronal and positive sagittal balances. Slight left elevated pelvic tilt.  Unremarkable SI joints and partially visualized hips.  Generalized osteopenia otherwise no discrete lytic or blastic lesions.  SVA 5.0 cm (0/+) LL:  33 deg PI:  42 deg PI-LL:  9 (0) PT:  21.5 deg (0/+)

## 2023-12-26 NOTE — ASSESSMENT
[FreeTextEntry1] : 73M is stable after lumbar and cervical surgery. He has some pains and still with some issues with left leg difficulty climbing stairs. Has progressive lumbar stenosis, now moderate to severe L2-L5. He has progressive C7T1 Modic changes and disc degeneration, but mild stenosis of canal. Pt improving with PT.  Mild sacropelvic disproportion and decreased lumbar lordosis.  Continue with PT. Rx: PT Return in 3 months.

## 2023-12-26 NOTE — PHYSICAL EXAM
[General Appearance - Alert] : alert [General Appearance - In No Acute Distress] : in no acute distress [Oriented To Time, Place, And Person] : oriented to person, place, and time [Impaired Insight] : insight and judgment were intact [Affect] : the affect was normal [Mood] : the mood was normal [FreeTextEntry6] : Left hip flex 5-/5, right dorsiflex 3/5. rest approx 5/5  [FreeTextEntry8] : gait midly asymmetrical

## 2023-12-26 NOTE — HISTORY OF PRESENT ILLNESS
[FreeTextEntry1] : MIKKI CHO is a 73 year old left hand dominant male who is s/p C3-4, C4-5 ACDFI from 12/22/2014 and L1-2 lami with excision of large extruded disc on 8/12/2014. He was last seen on 6/6/2022 with weakness in LE.  Pt returned with same issue reports right leg giving out. Pt c/o occasional low back pain but weakness on his leg is more bothering at this time. Pt c/o mild numbness and tingling in his LE. Pt was doing home exercises and therapy for helping with strengthening his muscles.  Left leg is stronger, but the hip is difficult to get up the stairs.  Left leg not giving out and not falling anymore.  He needs more Pt. but will start in the New Year.  Pt underwent repeat scoliosis.

## 2024-03-18 ENCOUNTER — APPOINTMENT (OUTPATIENT)
Dept: NEUROSURGERY | Facility: CLINIC | Age: 74
End: 2024-03-18
Payer: MEDICARE

## 2024-03-18 VITALS
WEIGHT: 270 LBS | HEIGHT: 75 IN | HEART RATE: 67 BPM | SYSTOLIC BLOOD PRESSURE: 174 MMHG | OXYGEN SATURATION: 97 % | BODY MASS INDEX: 33.57 KG/M2 | DIASTOLIC BLOOD PRESSURE: 105 MMHG

## 2024-03-18 PROCEDURE — 99214 OFFICE O/P EST MOD 30 MIN: CPT

## 2024-03-18 NOTE — PHYSICAL EXAM
[General Appearance - Alert] : alert [General Appearance - In No Acute Distress] : in no acute distress [General Appearance - Well Nourished] : well nourished [General Appearance - Well Developed] : well developed [Oriented To Time, Place, And Person] : oriented to person, place, and time [Affect] : the affect was normal [Mood] : the mood was normal [FreeTextEntry8] : gait is slow at start, but picks up, mildly asymmetrical. [FreeTextEntry6] : Motor LE is 5/5 except right dorsiflexion at baseline

## 2024-03-18 NOTE — HISTORY OF PRESENT ILLNESS
[FreeTextEntry1] : MIKKI CHO is a 73 year old left hand dominant male who is s/p C3-4, C4-5 ACDFI from 12/22/2014 and L1-2 lami with excision of large extruded disc on 8/12/2014. He was last seen on 6/6/2022 with weakness in LE.  Pt returned with same issue reports right leg giving out. Pt c/o occasional low back pain but weakness on his leg is more bothering at this time. Pt c/o mild numbness and tingling in his LE. Pt was doing home exercises and therapy for helping with strengthening his muscles.  Today, pt feels ok.  He does PT at home.  PT feels his left foot is stronger.  It does fatigue at times and has to walk upstairs holding onto both sides. Pt did not take BP meds today.  He denies headache. He says his leg is not really giving out on him any more.  He feels that he is benefitting from his exercises.

## 2024-03-18 NOTE — ASSESSMENT
[FreeTextEntry1] : 73M is stable after lumbar and cervical surgery. He has some pains and still with some issues with left leg difficulty climbing stairs. Has progressive lumbar stenosis, now moderate to severe L2-L5. He is not interested in having a decompressive laminectomy at this time.  Mild sacropelvic disproportion and decreased lumbar lordosis. PT takes collagen.  Takes BP at home, runs about 140/80 or 85. Saw his cardiologist a few days ago and BP was good.  Fr. Darryl  [address]. Pt took his meds with us today.  Continue home exercises. Return in 4 months. Continue to be compliant with BP meds.

## 2024-06-10 ENCOUNTER — APPOINTMENT (OUTPATIENT)
Dept: UROLOGY | Facility: CLINIC | Age: 74
End: 2024-06-10
Payer: MEDICARE

## 2024-06-10 DIAGNOSIS — N40.1 BENIGN PROSTATIC HYPERPLASIA WITH LOWER URINARY TRACT SYMPMS: ICD-10-CM

## 2024-06-10 DIAGNOSIS — N13.8 BENIGN PROSTATIC HYPERPLASIA WITH LOWER URINARY TRACT SYMPMS: ICD-10-CM

## 2024-06-10 DIAGNOSIS — C61 MALIGNANT NEOPLASM OF PROSTATE: ICD-10-CM

## 2024-06-10 DIAGNOSIS — N52.9 MALE ERECTILE DYSFUNCTION, UNSPECIFIED: ICD-10-CM

## 2024-06-10 PROCEDURE — 99213 OFFICE O/P EST LOW 20 MIN: CPT

## 2024-06-10 RX ORDER — TADALAFIL 20 MG/1
20 TABLET ORAL
Qty: 6 | Refills: 3 | Status: ACTIVE | COMMUNITY
Start: 2024-06-10 | End: 1900-01-01

## 2024-06-10 NOTE — ASSESSMENT
[FreeTextEntry1] : 73M here for f/u for PSA check and ED  #PSA check today and CaP -Pt s/p RASP, doing well, PVR = 20cc - PSA Today  #ED -Cialis sent for pt to try and see if pt can self-stimulated to see if able to obtain erections naturally as he has yet to try this  - AE d/w patient  RPA 1 year

## 2024-06-10 NOTE — HISTORY OF PRESENT ILLNESS
[FreeTextEntry1] : HPI: Mr. Sullivan is a 70 yo M with an elevated PSA to 11.7 ng/mL who underwent a prostate biopsy on 12/3/2021 with  Dr. Duff and found to have BPH and nonspecific granulomatous prostatitis. He currently has some continued nocturia 1-2x at night, previously has had nocturia qhour at night, he's currently on flomax 0.8mg QHS and a number of herbal supplements he has purchased.  Based on his MRI from 11/2021, found to have a volume of 116 cc although TRUS sizing around 90g. He underwent UDS today with synergic voiding, no overt DOs and blunted flow curve with minimal PVR (20cc) consisted with SANCHEZ.   Anticoagulation: None All: NKDA Social: never smoker, no EtOH PMHx: CAD, Eelevated PSA, HTN, HLD FHx: acute MI, HTN, obesity PSHx: cervical spine surgery, CABG, quadricepsplasty  Imaging: MRI as above  7/28: Here today with continued bladder outlet obstruction. Inconsistently taking the flomax and his supplements. Still voiding every 3-4 hours even at night. PVR Today 175 cc. Has been inconsistent with his urination and taking of medication. Cardiologist: Bc Andrews [address] [place] [address]  9/6: Here today s/p RASP, pathology pending. No n/v/f/c. Pain controlled and having some urinary frequency. No n/v/f/c. PVR today minimal. Discussed will resolve with time. No leg swelling.   9/22: Here today s/p RASP. PAthology with 81g BPH and 1% focus of benign tissue GG1. Discussed active surveillance and to continue to watch this. Having some frequency. PVR Today 65 cc.   12/8: Much improved frequency. Will continue active surveillance. PVR today 100cc, feels he has no urge to urinate, strong urinary flow.   3/2: Pathology with GG1, pathology 81g BPH otherwise. Pending PSA today. Discussed improved overactivity, down to nocturia x1. Strong urinary stream and voiding q4-6 hours. Having some bilateral testicular tenderness. NO n/v/f/c. No discharge.   5/11: Doing much better, testicular tenderness has resolved. No n/v/f/c. No complaints of urinary overactivity. Nocturia x 1. No discharge, very happy with urinary flow. No UUI. Prior pathology with GG1, PSA 0.13 ng/mL.  12/4/23 Pt presents for f/u and PSA today. Pt is s/p RASP. PVR - 72cc- pt voided right before entering room. Pt denies any voiding issues- no urgency, frequency, UTIs, hematuria. Pt reports on some days having nocturia- q2-3hrs and other nights can sleep for 5-6 hrs without waking up- not interested in medications. Pt feels it maybe related to high fluid intake close to bedtime- will eliminate drinking fluids 2-3 hrs before bedtime.   6/10/24 Pt here for f/u after RASP - PVR = 20. Last PSA = 0.13 (12/3)  Pt doing well- reports LE swelling - will see cardiology. Pending PSA. Also has right leg in brace, will be seeing orthopedics on Friday. [Urinary Incontinence] : no urinary incontinence [Urinary Retention] : no urinary retention [Urinary Urgency] : no urinary urgency [Urinary Frequency] : no urinary frequency

## 2024-06-10 NOTE — PHYSICAL EXAM
[General Appearance - Well Developed] : well developed [General Appearance - Well Nourished] : well nourished [Heart Rate And Rhythm] : heart rate and rhythm were normal [] : no respiratory distress [Abdomen Soft] : soft [Skin Turgor] : supple [No Focal Deficits] : no focal deficits [de-identified] : Pt walking with right knee brace and cane- came in via wheelchair.

## 2024-06-14 ENCOUNTER — APPOINTMENT (OUTPATIENT)
Dept: NEUROSURGERY | Facility: CLINIC | Age: 74
End: 2024-06-14
Payer: MEDICARE

## 2024-06-14 VITALS
HEIGHT: 75 IN | HEART RATE: 77 BPM | DIASTOLIC BLOOD PRESSURE: 86 MMHG | BODY MASS INDEX: 32.83 KG/M2 | WEIGHT: 264 LBS | OXYGEN SATURATION: 93 % | SYSTOLIC BLOOD PRESSURE: 160 MMHG

## 2024-06-14 DIAGNOSIS — M48.061 SPINAL STENOSIS, LUMBAR REGION WITHOUT NEUROGENIC CLAUDICATION: ICD-10-CM

## 2024-06-14 PROCEDURE — 99214 OFFICE O/P EST MOD 30 MIN: CPT

## 2024-06-15 LAB — PSA SERPL-MCNC: 0.12 NG/ML

## 2024-06-17 ENCOUNTER — RESULT REVIEW (OUTPATIENT)
Age: 74
End: 2024-06-17

## 2024-06-17 ENCOUNTER — OUTPATIENT (OUTPATIENT)
Dept: OUTPATIENT SERVICES | Facility: HOSPITAL | Age: 74
LOS: 1 days | End: 2024-06-17
Payer: MEDICARE

## 2024-06-17 ENCOUNTER — APPOINTMENT (OUTPATIENT)
Dept: MRI IMAGING | Facility: CLINIC | Age: 74
End: 2024-06-17

## 2024-06-17 ENCOUNTER — APPOINTMENT (OUTPATIENT)
Dept: CT IMAGING | Facility: CLINIC | Age: 74
End: 2024-06-17

## 2024-06-17 DIAGNOSIS — Z98.89 OTHER SPECIFIED POSTPROCEDURAL STATES: Chronic | ICD-10-CM

## 2024-06-17 DIAGNOSIS — Z98.890 OTHER SPECIFIED POSTPROCEDURAL STATES: Chronic | ICD-10-CM

## 2024-06-17 DIAGNOSIS — M48.061 SPINAL STENOSIS, LUMBAR REGION WITHOUT NEUROGENIC CLAUDICATION: ICD-10-CM

## 2024-06-17 DIAGNOSIS — Z95.5 PRESENCE OF CORONARY ANGIOPLASTY IMPLANT AND GRAFT: Chronic | ICD-10-CM

## 2024-06-17 PROCEDURE — 76376 3D RENDER W/INTRP POSTPROCES: CPT

## 2024-06-17 PROCEDURE — 72148 MRI LUMBAR SPINE W/O DYE: CPT

## 2024-06-17 PROCEDURE — 76376 3D RENDER W/INTRP POSTPROCES: CPT | Mod: 26

## 2024-06-17 PROCEDURE — 72131 CT LUMBAR SPINE W/O DYE: CPT | Mod: 26,MH

## 2024-06-17 PROCEDURE — 72131 CT LUMBAR SPINE W/O DYE: CPT

## 2024-06-17 PROCEDURE — 72148 MRI LUMBAR SPINE W/O DYE: CPT | Mod: 26,MH

## 2024-06-17 NOTE — ASSESSMENT
[FreeTextEntry1] : 73M is s/p previous lumbar and cervical surgery. Pt with 3 weeks of worsening right radicular pain and legs giving out. Has progressive lumbar stenosis, now moderate to severe L2-L5. Last MRI was 4/2023.  Rx:  formal course of PT MRI and CT LS spine Return in 6 weeks

## 2024-06-17 NOTE — HISTORY OF PRESENT ILLNESS
[FreeTextEntry1] : MIKKI CHO is a 73 year old left hand dominant male who is s/p C3-4, C4-5 ACDFI from 12/22/2014 and L1-2 lami with excision of large extruded disc on 8/12/2014. He was last seen on 6/6/2022 with weakness in LE.  Pt returned with same issue reports right leg giving out. Pt c/o occasional low back pain but weakness on his leg is more bothering at this time. Pt c/o mild numbness and tingling in his LE. Pt was doing home exercises and therapy for helping with strengthening his muscles.  Today, pt feels ok.  He does PT at home.  PT feels his left foot is stronger.  It does fatigue at times and has to walk upstairs holding onto both sides. Pt did not take BP meds today.  He denies headache. He says his leg is not really giving out on him any more.  He feels that he is benefitting from his exercises.  Three weeks ago noted that his right leg is not holding him up.  He gets pain in the right and sciatic notch and right knee. Knee feels numb.  He switch to the walker as he had collapsed from the legs.  He feels that the legs are strong, but he relies on the walker to support the weight.  No B/B changes.  Pain radiates from right hip to side of right knee, some pain on the left but not to the same degree.

## 2024-06-17 NOTE — PHYSICAL EXAM
[General Appearance - Alert] : alert [General Appearance - In No Acute Distress] : in no acute distress [Oriented To Time, Place, And Person] : oriented to person, place, and time [Impaired Insight] : insight and judgment were intact [2+] : Ankle jerk left 2+ [FreeTextEntry6] : Right hip flex 4+/5, wearing right AFO for baseline foot drop.  Left 5/5, right knee extension 5-/5 [FreeTextEntry8] : uses walker and able to walk fairly symmetrically, no walker then he limps, favoring left side. [FreeTextEntry9] : right AFO

## 2024-07-19 ENCOUNTER — APPOINTMENT (OUTPATIENT)
Dept: NEUROSURGERY | Facility: CLINIC | Age: 74
End: 2024-07-19

## 2024-07-26 ENCOUNTER — APPOINTMENT (OUTPATIENT)
Dept: NEUROSURGERY | Facility: CLINIC | Age: 74
End: 2024-07-26

## 2024-07-26 VITALS
WEIGHT: 252 LBS | HEART RATE: 54 BPM | BODY MASS INDEX: 31.33 KG/M2 | OXYGEN SATURATION: 94 % | HEIGHT: 75 IN | DIASTOLIC BLOOD PRESSURE: 91 MMHG | SYSTOLIC BLOOD PRESSURE: 163 MMHG

## 2024-07-26 PROCEDURE — 99215 OFFICE O/P EST HI 40 MIN: CPT

## 2024-08-03 NOTE — HISTORY OF PRESENT ILLNESS
[FreeTextEntry1] : MIKKI CHO is a 73 year old left hand dominant male who is s/p C3-4, C4-5 ACDFI from 12/22/2014 and L1-2 lami with excision of large extruded disc on 8/12/2014. He was last seen on 6/6/2022 with weakness in LE.  Pt returned with same issue reports right leg giving out. Pt c/o occasional low back pain but weakness on his leg is more bothering at this time. Pt c/o mild numbness and tingling in his LE. Pt was doing home exercises and therapy for helping with strengthening his muscles.  Today, pt feels not great with walking.  He is having pain radiating to the right leg.  Pt has numbness and pain in the back of  the knee . The balance is off .  He is now using walker to avoid falling.. He is now wearing the right AFO for his baseline foot drop.  PT has lost some weight.  He is trying to lose weight to make it easier to walk. He is doing PT 2x/wk.  It is helping some.  Pain when he walks. Pt underwent MR and lumbar imaging.

## 2024-08-03 NOTE — ASSESSMENT
[FreeTextEntry1] : 73M is s/p previous lumbar and cervical surgery. Pt with deteriorating gait and undergoing PT.  Has mild progressive lumbar stenosis on MRI.  CT shows stable bony elements from last.  He is not keen on surgery right now and wants to continue therapy.  He will let us know when he needs another prescription.  I described EDS as an option, but pt not keen on this either. Will obtain Flex/ext.  Likely candidate for decompressive laminectomy L1/2-L45.  L45 is the most severely stenotic level, with L34 as moderate to severe which has mildly progressed since last imaging.  Rx:   Xray flex/ext Lumbar Return in 3 months.  Notes to PCP and cardiologist

## 2024-08-03 NOTE — PHYSICAL EXAM
[General Appearance - Alert] : alert [General Appearance - In No Acute Distress] : in no acute distress [General Appearance - Well Nourished] : well nourished [General Appearance - Well Developed] : well developed [Oriented To Time, Place, And Person] : oriented to person, place, and time [Impaired Insight] : insight and judgment were intact [FreeTextEntry1] : Appears to have lost weight.   [FreeTextEntry8] : Gait is slow with walker, asymmetrical but alternating.  Stands with stooped posture but able to straighten up when walking. [FreeTextEntry6] : Has right foot drop, Motors is otherwise 5.5 mostly except mild weakness of right quad.

## 2024-08-03 NOTE — RESULTS/DATA
[FreeTextEntry1] : EXAM: [id number] - MR SPINE LUMBAR - ORDERED BY: KENNETH DIALLO   PROCEDURE DATE: 06/17/2024    INTERPRETATION: CLINICAL INFORMATION: Lumbar discectomy with right lower extremity radiculopathy  ADDITIONAL CLINICAL INFORMATION: Not Applicable  TECHNIQUE: Multiplanar, multisequence MRI was performed of the lumbar spine. IV Contrast: NONE  PRIOR STUDIES: No priors available for comparison.  FINDINGS:  LOCALIZER: No additional findings. BONES: Marked endplate marrow edema at L1-L2 with lesser endplate marrow edema at L2-L3 and L5-S1. ALIGNMENT: Trace anterolisthesis at L4-L5 SACROILIAC JOINTS/SACRUM: There is no sacral fracture. The SI joints are partially visualized but are intact. CONUS AND CAUDA EQUINA: The distal cord and conus are normal in signal. Conus terminates at L1. VISUALIZED INTRAPELVIC/INTRA-ABDOMINAL SOFT TISSUES: Normal. PARASPINAL SOFT TISSUES: Low signal scarring posteriorly at the L1-L2 level   INDIVIDUAL LEVELS:  LOWER THORACIC SPINE: No spinal canal or neuroforaminal stenosis.  L1-L2: Mild disc space narrowing with endplate osteophyte formation with bone marrow edema and cystic change. Disc bulging. Mild facet arthrosis. Status post laminectomy. Mild spinal canal stenosis. L2-L3: Moderate disc space narrowing with mild to moderate disc bulging and endplate osteophyte formation. Mild bilateral foraminal narrowing. Mild to moderate spinal canal stenosis. L3-L4: Moderate-sized posterior disc protrusion with moderate facet arthrosis and ligamentous thickening resulting in severe spinal canal stenosis. Mild left and moderate right foraminal narrowing. L4-L5: Moderate to severe facet arthrosis with grade 1 anterolisthesis and disc bulging. Moderate to severe bilateral lateral recess stenosis with mild to moderate spinal canal stenosis. Moderate bilateral foraminal narrowing. L5-S1: Mild disc height loss with mild disc bulging and endplate osteophyte formation. Mild foraminal narrowing.   IMPRESSION:  Multilevel lumbar spondylosis with facet arthrosis and degenerative disease resulting in multilevel spinal canal and foraminal narrowing is most advanced at L3-L4 and L4-L5. Severe degenerative disc disease at L1-L2 with marked endplate marrow edema. These findings have mildly progressed compared to the prior study from 4/25/2023.  EXAM: [id number] - CT 3D RECONSTRUCT WO [place] - ORDERED BY: KENNETH DIALLO  EXAM: [id number] - CT LUMBAR SPINE - ORDERED BY: KENNETH DIALLO   PROCEDURE DATE: 06/17/2024    INTERPRETATION: LUMBOSACRAL SPINE CT  CLINICAL INFORMATION: Prior lumbar discectomy with right lower extremity radicular symptoms. TECHNIQUE: Axial CT images were obtained of the lumbosacral spine with coronal and sagittal reconstructions. 3-dimensional reconstructions were obtained. Comparison is made to prior MRI from 4/25/2023 and MRI from the same date on 6/17/2024 as well as prior CT from 4/25/2023 FINDINGS:  ALIGNMENT: Straightening of the normal lumbar lordosis.  DISC SPACES:  L1/L2: Mild disc space narrowing with gas in the disc space along with extensive endplate cystic change and sclerosis. Circumferential endplate osteophyte formation. Mild to moderate disc bulging. Moderate facet arthrosis with a central laminectomy defect.  L2/L3: Moderate disc height loss with endplate cystic change and endplate sclerosis. Disc bulging with endplate osteophyte formation mild to moderate facet arthrosis.  L3/L4: Moderate left and mild right facet arthrosis. Disc bulging and endplate osseous ridging with gas in the disc space. The osseous ridging is asymmetric to the left. Mild infraspinatus arthrosis.  L4/L5: Severe facet arthrosis with disc bulging right-sided and anterior endplate osteophyte formation.  L5/S1: Moderate disc height loss with gas in the disc space with endplate sclerosis and cystic change and mild endplate osseous ridging.  SI JOINTS: Mild to moderate bilateral SI joint arthrosis. INTRAABDOMINAL AND INTRAPELVIC SOFT TISSUES: Vascular calcification  IMPRESSION: Moderate to severe multilevel degenerative disc disease is most advanced at L1-L2, L2-L3 and L5-S1. Please refer to the MRI performed on the same date for better evaluation of the degree of spinal canal and foraminal narrowing. Overall, CT findings are similar to prior CT scan from 4/25/2023.  --- End of Report ---

## 2024-08-03 NOTE — RESULTS/DATA
[FreeTextEntry1] : EXAM: [id number] - MR SPINE LUMBAR - ORDERED BY: KENNETH DIALLO   PROCEDURE DATE: 06/17/2024    INTERPRETATION: CLINICAL INFORMATION: Lumbar discectomy with right lower extremity radiculopathy  ADDITIONAL CLINICAL INFORMATION: Not Applicable  TECHNIQUE: Multiplanar, multisequence MRI was performed of the lumbar spine. IV Contrast: NONE  PRIOR STUDIES: No priors available for comparison.  FINDINGS:  LOCALIZER: No additional findings. BONES: Marked endplate marrow edema at L1-L2 with lesser endplate marrow edema at L2-L3 and L5-S1. ALIGNMENT: Trace anterolisthesis at L4-L5 SACROILIAC JOINTS/SACRUM: There is no sacral fracture. The SI joints are partially visualized but are intact. CONUS AND CAUDA EQUINA: The distal cord and conus are normal in signal. Conus terminates at L1. VISUALIZED INTRAPELVIC/INTRA-ABDOMINAL SOFT TISSUES: Normal. PARASPINAL SOFT TISSUES: Low signal scarring posteriorly at the L1-L2 level   INDIVIDUAL LEVELS:  LOWER THORACIC SPINE: No spinal canal or neuroforaminal stenosis.  L1-L2: Mild disc space narrowing with endplate osteophyte formation with bone marrow edema and cystic change. Disc bulging. Mild facet arthrosis. Status post laminectomy. Mild spinal canal stenosis. L2-L3: Moderate disc space narrowing with mild to moderate disc bulging and endplate osteophyte formation. Mild bilateral foraminal narrowing. Mild to moderate spinal canal stenosis. L3-L4: Moderate-sized posterior disc protrusion with moderate facet arthrosis and ligamentous thickening resulting in severe spinal canal stenosis. Mild left and moderate right foraminal narrowing. L4-L5: Moderate to severe facet arthrosis with grade 1 anterolisthesis and disc bulging. Moderate to severe bilateral lateral recess stenosis with mild to moderate spinal canal stenosis. Moderate bilateral foraminal narrowing. L5-S1: Mild disc height loss with mild disc bulging and endplate osteophyte formation. Mild foraminal narrowing.   IMPRESSION:  Multilevel lumbar spondylosis with facet arthrosis and degenerative disease resulting in multilevel spinal canal and foraminal narrowing is most advanced at L3-L4 and L4-L5. Severe degenerative disc disease at L1-L2 with marked endplate marrow edema. These findings have mildly progressed compared to the prior study from 4/25/2023.  EXAM: [id number] - CT 3D RECONSTRUCT WO [place] - ORDERED BY: KENNETH DIALLO  EXAM: [id number] - CT LUMBAR SPINE - ORDERED BY: KENNETH DIALLO   PROCEDURE DATE: 06/17/2024    INTERPRETATION: LUMBOSACRAL SPINE CT  CLINICAL INFORMATION: Prior lumbar discectomy with right lower extremity radicular symptoms. TECHNIQUE: Axial CT images were obtained of the lumbosacral spine with coronal and sagittal reconstructions. 3-dimensional reconstructions were obtained. Comparison is made to prior MRI from 4/25/2023 and MRI from the same date on 6/17/2024 as well as prior CT from 4/25/2023 FINDINGS:  ALIGNMENT: Straightening of the normal lumbar lordosis.  DISC SPACES:  L1/L2: Mild disc space narrowing with gas in the disc space along with extensive endplate cystic change and sclerosis. Circumferential endplate osteophyte formation. Mild to moderate disc bulging. Moderate facet arthrosis with a central laminectomy defect.  L2/L3: Moderate disc height loss with endplate cystic change and endplate sclerosis. Disc bulging with endplate osteophyte formation mild to moderate facet arthrosis.  L3/L4: Moderate left and mild right facet arthrosis. Disc bulging and endplate osseous ridging with gas in the disc space. The osseous ridging is asymmetric to the left. Mild infraspinatus arthrosis.  L4/L5: Severe facet arthrosis with disc bulging right-sided and anterior endplate osteophyte formation.  L5/S1: Moderate disc height loss with gas in the disc space with endplate sclerosis and cystic change and mild endplate osseous ridging.  SI JOINTS: Mild to moderate bilateral SI joint arthrosis. INTRAABDOMINAL AND INTRAPELVIC SOFT TISSUES: Vascular calcification  IMPRESSION: Moderate to severe multilevel degenerative disc disease is most advanced at L1-L2, L2-L3 and L5-S1. Please refer to the MRI performed on the same date for better evaluation of the degree of spinal canal and foraminal narrowing. Overall, CT findings are similar to prior CT scan from 4/25/2023.  --- End of Report ---

## 2024-08-03 NOTE — PHYSICAL EXAM
[General Appearance - Alert] : alert [General Appearance - In No Acute Distress] : in no acute distress [General Appearance - Well Nourished] : well nourished [General Appearance - Well Developed] : well developed [Oriented To Time, Place, And Person] : oriented to person, place, and time [Impaired Insight] : insight and judgment were intact [FreeTextEntry1] : Appears to have lost weight.   [FreeTextEntry6] : Has right foot drop, Motors is otherwise 5.5 mostly except mild weakness of right quad.  [FreeTextEntry8] : Gait is slow with walker, asymmetrical but alternating.  Stands with stooped posture but able to straighten up when walking.

## 2024-08-03 NOTE — CONSULT LETTER
[Dear  ___] : Dear  [unfilled], [Courtesy Letter:] : I had the pleasure of seeing your patient, [unfilled], in my office today. [Please see my note below.] : Please see my note below. [Consult Closing:] : Thank you very much for allowing me to participate in the care of this patient.  If you have any questions, please do not hesitate to contact me. [Sincerely,] : Sincerely, [FreeTextEntry2] : [address]  [address], [address] [place] [address] [FreeTextEntry3] : Jair Danielle MD, FACS Department of Neurosurgery [phone] [DrAshli  ___] : Dr. MCMILLAN

## 2024-08-03 NOTE — CONSULT LETTER
[Dear  ___] : Dear  [unfilled], suicidal thoughts [Courtesy Letter:] : I had the pleasure of seeing your patient, [unfilled], in my office today. [Please see my note below.] : Please see my note below. [Consult Closing:] : Thank you very much for allowing me to participate in the care of this patient.  If you have any questions, please do not hesitate to contact me. [Sincerely,] : Sincerely, [FreeTextEntry3] : Jair Danielle MD, FACS Department of Neurosurgery [phone] [FreeTextEntry2] : [address]  [address], [address] [place] [address] [DrAshli  ___] : Dr. MCMILLAN

## 2024-10-04 ENCOUNTER — APPOINTMENT (OUTPATIENT)
Dept: RADIOLOGY | Facility: IMAGING CENTER | Age: 74
End: 2024-10-04
Payer: MEDICARE

## 2024-10-04 ENCOUNTER — OUTPATIENT (OUTPATIENT)
Dept: OUTPATIENT SERVICES | Facility: HOSPITAL | Age: 74
LOS: 1 days | End: 2024-10-04
Payer: MEDICARE

## 2024-10-04 DIAGNOSIS — Z98.89 OTHER SPECIFIED POSTPROCEDURAL STATES: Chronic | ICD-10-CM

## 2024-10-04 DIAGNOSIS — Z95.5 PRESENCE OF CORONARY ANGIOPLASTY IMPLANT AND GRAFT: Chronic | ICD-10-CM

## 2024-10-04 DIAGNOSIS — Z98.890 OTHER SPECIFIED POSTPROCEDURAL STATES: Chronic | ICD-10-CM

## 2024-10-04 DIAGNOSIS — M48.061 SPINAL STENOSIS, LUMBAR REGION WITHOUT NEUROGENIC CLAUDICATION: ICD-10-CM

## 2024-10-04 PROCEDURE — 72110 X-RAY EXAM L-2 SPINE 4/>VWS: CPT | Mod: 26

## 2024-10-18 ENCOUNTER — APPOINTMENT (OUTPATIENT)
Dept: NEUROSURGERY | Facility: CLINIC | Age: 74
End: 2024-10-18

## 2024-10-18 ENCOUNTER — NON-APPOINTMENT (OUTPATIENT)
Age: 74
End: 2024-10-18

## 2024-10-18 VITALS
HEIGHT: 75 IN | SYSTOLIC BLOOD PRESSURE: 180 MMHG | DIASTOLIC BLOOD PRESSURE: 94 MMHG | HEART RATE: 75 BPM | OXYGEN SATURATION: 96 %

## 2024-10-18 VITALS — DIASTOLIC BLOOD PRESSURE: 86 MMHG | SYSTOLIC BLOOD PRESSURE: 163 MMHG

## 2024-10-18 PROCEDURE — 99214 OFFICE O/P EST MOD 30 MIN: CPT

## 2024-10-18 NOTE — PATIENT PROFILE ADULT. - PURPOSEFUL PROACTIVE ROUNDING
glimepiride increased by diabetic clinic office to 4 mg daily also on metformin extended release 5 or milligrams twice daily   Patient

## 2024-11-20 PROCEDURE — 72110 X-RAY EXAM L-2 SPINE 4/>VWS: CPT

## 2024-11-27 ENCOUNTER — OUTPATIENT (OUTPATIENT)
Dept: OUTPATIENT SERVICES | Facility: HOSPITAL | Age: 74
LOS: 1 days | End: 2024-11-27
Payer: MEDICARE

## 2024-11-27 ENCOUNTER — APPOINTMENT (OUTPATIENT)
Dept: MRI IMAGING | Facility: CLINIC | Age: 74
End: 2024-11-27

## 2024-11-27 DIAGNOSIS — Z98.89 OTHER SPECIFIED POSTPROCEDURAL STATES: Chronic | ICD-10-CM

## 2024-11-27 DIAGNOSIS — Z98.890 OTHER SPECIFIED POSTPROCEDURAL STATES: Chronic | ICD-10-CM

## 2024-11-27 DIAGNOSIS — M48.061 SPINAL STENOSIS, LUMBAR REGION WITHOUT NEUROGENIC CLAUDICATION: ICD-10-CM

## 2024-11-27 DIAGNOSIS — Z95.5 PRESENCE OF CORONARY ANGIOPLASTY IMPLANT AND GRAFT: Chronic | ICD-10-CM

## 2024-11-27 PROCEDURE — 72146 MRI CHEST SPINE W/O DYE: CPT

## 2024-11-27 PROCEDURE — 72146 MRI CHEST SPINE W/O DYE: CPT | Mod: 26,MH

## 2024-12-20 ENCOUNTER — APPOINTMENT (OUTPATIENT)
Dept: NEUROSURGERY | Facility: CLINIC | Age: 74
End: 2024-12-20
Payer: MEDICARE

## 2024-12-20 VITALS
OXYGEN SATURATION: 94 % | HEIGHT: 75 IN | BODY MASS INDEX: 31.33 KG/M2 | SYSTOLIC BLOOD PRESSURE: 140 MMHG | RESPIRATION RATE: 16 BRPM | WEIGHT: 252 LBS | DIASTOLIC BLOOD PRESSURE: 80 MMHG | HEART RATE: 80 BPM

## 2024-12-20 VITALS — BODY MASS INDEX: 30 KG/M2 | WEIGHT: 240 LBS

## 2024-12-20 DIAGNOSIS — M48.061 SPINAL STENOSIS, LUMBAR REGION WITHOUT NEUROGENIC CLAUDICATION: ICD-10-CM

## 2024-12-20 PROCEDURE — 99214 OFFICE O/P EST MOD 30 MIN: CPT

## 2025-01-16 NOTE — H&P PST ADULT - RADIAL PULSE
Medication: escitalopram (LEXAPRO) 20 MG tablet                      gabapentin (NEURONTIN) 300 MG capsule  passed protocol.   Last office visit date: 1/9/25  Next appointment scheduled?: Yes ,2/20/25  Number of refills given: 2    Per PDMP, last sold 11/20/24 #180, 30 day supply.     1/9/25,  Psychiatric Medication Regimen As of Today  Escitalopram 20mg by mouth nightly   Quetiapine 400mg by mouth nightly (increase)  Lamotrigine 50mg by mouth in the AM and 100mg in the PM   Hydroxyzine 25-100mg by mouth every 6 hours as needed   Doxepin 25mg by mouth nightly as needed   Gabapentin 600mg by mouth three times daily     
right normal/left normal

## 2025-03-21 ENCOUNTER — APPOINTMENT (OUTPATIENT)
Dept: NEUROSURGERY | Facility: CLINIC | Age: 75
End: 2025-03-21

## 2025-03-21 VITALS
OXYGEN SATURATION: 94 % | WEIGHT: 240 LBS | HEART RATE: 74 BPM | BODY MASS INDEX: 29.84 KG/M2 | DIASTOLIC BLOOD PRESSURE: 90 MMHG | SYSTOLIC BLOOD PRESSURE: 180 MMHG | HEIGHT: 75 IN

## 2025-03-21 PROCEDURE — 99214 OFFICE O/P EST MOD 30 MIN: CPT

## 2025-06-16 ENCOUNTER — NON-APPOINTMENT (OUTPATIENT)
Age: 75
End: 2025-06-16

## 2025-06-16 ENCOUNTER — APPOINTMENT (OUTPATIENT)
Dept: UROLOGY | Facility: CLINIC | Age: 75
End: 2025-06-16
Payer: MEDICARE

## 2025-06-16 VITALS
HEART RATE: 94 BPM | TEMPERATURE: 99.5 F | SYSTOLIC BLOOD PRESSURE: 181 MMHG | WEIGHT: 240 LBS | BODY MASS INDEX: 29.84 KG/M2 | HEIGHT: 75 IN | DIASTOLIC BLOOD PRESSURE: 96 MMHG | RESPIRATION RATE: 17 BRPM

## 2025-06-16 PROCEDURE — G2211 COMPLEX E/M VISIT ADD ON: CPT

## 2025-06-16 PROCEDURE — 99215 OFFICE O/P EST HI 40 MIN: CPT

## 2025-06-16 RX ORDER — TAMSULOSIN HYDROCHLORIDE 0.4 MG/1
0.4 CAPSULE ORAL
Qty: 90 | Refills: 3 | Status: ACTIVE | COMMUNITY
Start: 2025-06-16 | End: 1900-01-01

## 2025-06-17 LAB — PSA SERPL-MCNC: 0.17 NG/ML

## 2025-06-20 ENCOUNTER — APPOINTMENT (OUTPATIENT)
Dept: NEUROSURGERY | Facility: CLINIC | Age: 75
End: 2025-06-20
Payer: MEDICARE

## 2025-06-20 VITALS
BODY MASS INDEX: 32.08 KG/M2 | SYSTOLIC BLOOD PRESSURE: 190 MMHG | OXYGEN SATURATION: 96 % | HEART RATE: 74 BPM | HEIGHT: 74 IN | DIASTOLIC BLOOD PRESSURE: 94 MMHG | RESPIRATION RATE: 17 BRPM | WEIGHT: 250 LBS

## 2025-06-20 PROCEDURE — 99213 OFFICE O/P EST LOW 20 MIN: CPT
